# Patient Record
Sex: FEMALE | Race: WHITE | Employment: OTHER | URBAN - METROPOLITAN AREA
[De-identification: names, ages, dates, MRNs, and addresses within clinical notes are randomized per-mention and may not be internally consistent; named-entity substitution may affect disease eponyms.]

---

## 2018-05-16 ENCOUNTER — HOSPITAL ENCOUNTER (EMERGENCY)
Age: 73
Discharge: HOME | End: 2018-05-16
Attending: EMERGENCY MEDICINE
Payer: MEDICARE

## 2018-05-16 ENCOUNTER — APPOINTMENT (OUTPATIENT)
Dept: CT IMAGING | Age: 73
End: 2018-05-16
Attending: EMERGENCY MEDICINE
Payer: MEDICARE

## 2018-05-16 ENCOUNTER — APPOINTMENT (OUTPATIENT)
Dept: MRI IMAGING | Age: 73
End: 2018-05-16
Attending: EMERGENCY MEDICINE
Payer: MEDICARE

## 2018-05-16 VITALS
TEMPERATURE: 98 F | WEIGHT: 170 LBS | DIASTOLIC BLOOD PRESSURE: 70 MMHG | SYSTOLIC BLOOD PRESSURE: 156 MMHG | HEART RATE: 84 BPM | BODY MASS INDEX: 26.68 KG/M2 | HEIGHT: 67 IN | RESPIRATION RATE: 16 BRPM | OXYGEN SATURATION: 98 %

## 2018-05-16 DIAGNOSIS — R42 DIZZINESS: Primary | ICD-10-CM

## 2018-05-16 DIAGNOSIS — J01.00 ACUTE NON-RECURRENT MAXILLARY SINUSITIS: ICD-10-CM

## 2018-05-16 LAB
ALBUMIN SERPL-MCNC: 3.6 G/DL (ref 3.2–4.6)
ALBUMIN/GLOB SERPL: 0.7 {RATIO} (ref 1.2–3.5)
ALP SERPL-CCNC: 67 U/L (ref 50–136)
ALT SERPL-CCNC: 27 U/L (ref 12–65)
ANION GAP SERPL CALC-SCNC: 10 MMOL/L (ref 7–16)
AST SERPL-CCNC: 23 U/L (ref 15–37)
ATRIAL RATE: 68 BPM
BASOPHILS # BLD: 0 K/UL (ref 0–0.2)
BASOPHILS NFR BLD: 1 % (ref 0–2)
BILIRUB SERPL-MCNC: 0.5 MG/DL (ref 0.2–1.1)
BUN SERPL-MCNC: 10 MG/DL (ref 8–23)
CALCIUM SERPL-MCNC: 9.3 MG/DL (ref 8.3–10.4)
CALCULATED P AXIS, ECG09: 66 DEGREES
CALCULATED R AXIS, ECG10: -9 DEGREES
CALCULATED T AXIS, ECG11: 51 DEGREES
CHLORIDE SERPL-SCNC: 106 MMOL/L (ref 98–107)
CO2 SERPL-SCNC: 25 MMOL/L (ref 21–32)
CREAT SERPL-MCNC: 0.83 MG/DL (ref 0.6–1)
DIAGNOSIS, 93000: NORMAL
DIFFERENTIAL METHOD BLD: ABNORMAL
EOSINOPHIL # BLD: 0 K/UL (ref 0–0.8)
EOSINOPHIL NFR BLD: 1 % (ref 0.5–7.8)
ERYTHROCYTE [DISTWIDTH] IN BLOOD BY AUTOMATED COUNT: 13 % (ref 11.9–14.6)
GLOBULIN SER CALC-MCNC: 4.9 G/DL (ref 2.3–3.5)
GLUCOSE SERPL-MCNC: 101 MG/DL (ref 65–100)
HCT VFR BLD AUTO: 45.4 % (ref 35.8–46.3)
HGB BLD-MCNC: 16.1 G/DL (ref 11.7–15.4)
IMM GRANULOCYTES # BLD: 0 K/UL (ref 0–0.5)
IMM GRANULOCYTES NFR BLD AUTO: 0 % (ref 0–5)
LYMPHOCYTES # BLD: 1.8 K/UL (ref 0.5–4.6)
LYMPHOCYTES NFR BLD: 34 % (ref 13–44)
MCH RBC QN AUTO: 31 PG (ref 26.1–32.9)
MCHC RBC AUTO-ENTMCNC: 35.5 G/DL (ref 31.4–35)
MCV RBC AUTO: 87.5 FL (ref 79.6–97.8)
MONOCYTES # BLD: 0.4 K/UL (ref 0.1–1.3)
MONOCYTES NFR BLD: 8 % (ref 4–12)
NEUTS SEG # BLD: 3.1 K/UL (ref 1.7–8.2)
NEUTS SEG NFR BLD: 56 % (ref 43–78)
P-R INTERVAL, ECG05: 186 MS
PLATELET # BLD AUTO: 265 K/UL (ref 150–450)
PMV BLD AUTO: 9.5 FL (ref 10.8–14.1)
POTASSIUM SERPL-SCNC: 3.6 MMOL/L (ref 3.5–5.1)
PROT SERPL-MCNC: 8.5 G/DL (ref 6.3–8.2)
Q-T INTERVAL, ECG07: 392 MS
QRS DURATION, ECG06: 90 MS
QTC CALCULATION (BEZET), ECG08: 416 MS
RBC # BLD AUTO: 5.19 M/UL (ref 4.05–5.25)
SODIUM SERPL-SCNC: 141 MMOL/L (ref 136–145)
TROPONIN I SERPL-MCNC: <0.02 NG/ML (ref 0.02–0.05)
VENTRICULAR RATE, ECG03: 68 BPM
WBC # BLD AUTO: 5.4 K/UL (ref 4.3–11.1)

## 2018-05-16 PROCEDURE — 85025 COMPLETE CBC W/AUTO DIFF WBC: CPT | Performed by: EMERGENCY MEDICINE

## 2018-05-16 PROCEDURE — 93005 ELECTROCARDIOGRAM TRACING: CPT | Performed by: EMERGENCY MEDICINE

## 2018-05-16 PROCEDURE — 81003 URINALYSIS AUTO W/O SCOPE: CPT | Performed by: EMERGENCY MEDICINE

## 2018-05-16 PROCEDURE — 70551 MRI BRAIN STEM W/O DYE: CPT

## 2018-05-16 PROCEDURE — 99285 EMERGENCY DEPT VISIT HI MDM: CPT | Performed by: EMERGENCY MEDICINE

## 2018-05-16 PROCEDURE — 80053 COMPREHEN METABOLIC PANEL: CPT | Performed by: EMERGENCY MEDICINE

## 2018-05-16 PROCEDURE — 84484 ASSAY OF TROPONIN QUANT: CPT | Performed by: EMERGENCY MEDICINE

## 2018-05-16 PROCEDURE — 70450 CT HEAD/BRAIN W/O DYE: CPT

## 2018-05-16 RX ORDER — AMOXICILLIN AND CLAVULANATE POTASSIUM 875; 125 MG/1; MG/1
1 TABLET, FILM COATED ORAL 2 TIMES DAILY
Qty: 14 TAB | Refills: 0 | Status: SHIPPED | OUTPATIENT
Start: 2018-05-16 | End: 2018-08-15 | Stop reason: ALTCHOICE

## 2018-05-16 NOTE — DISCHARGE INSTRUCTIONS
Dizziness: Care Instructions  Your Care Instructions  Dizziness is the feeling of unsteadiness or fuzziness in your head. It is different than having vertigo, which is a feeling that the room is spinning or that you are moving or falling. It is also different from lightheadedness, which is the feeling that you are about to faint. It can be hard to know what causes dizziness. Some people feel dizzy when they have migraine headaches. Sometimes bouts of flu can make you feel dizzy. Some medical conditions, such as heart problems or high blood pressure, can make you feel dizzy. Many medicines can cause dizziness, including medicines for high blood pressure, pain, or anxiety. If a medicine causes your symptoms, your doctor may recommend that you stop or change the medicine. If it is a problem with your heart, you may need medicine to help your heart work better. If there is no clear reason for your symptoms, your doctor may suggest watching and waiting for a while to see if the dizziness goes away on its own. Follow-up care is a key part of your treatment and safety. Be sure to make and go to all appointments, and call your doctor if you are having problems. It's also a good idea to know your test results and keep a list of the medicines you take. How can you care for yourself at home? · If your doctor recommends or prescribes medicine, take it exactly as directed. Call your doctor if you think you are having a problem with your medicine. · Do not drive while you feel dizzy. · Try to prevent falls. Steps you can take include:  ¨ Using nonskid mats, adding grab bars near the tub, and using night-lights. ¨ Clearing your home so that walkways are free of anything you might trip on. ¨ Letting family and friends know that you have been feeling dizzy. This will help them know how to help you. When should you call for help? Call 911 anytime you think you may need emergency care.  For example, call if:  ? · You passed out (lost consciousness). ? · You have dizziness along with symptoms of a heart attack. These may include:  ¨ Chest pain or pressure, or a strange feeling in the chest.  ¨ Sweating. ¨ Shortness of breath. ¨ Nausea or vomiting. ¨ Pain, pressure, or a strange feeling in the back, neck, jaw, or upper belly or in one or both shoulders or arms. ¨ Lightheadedness or sudden weakness. ¨ A fast or irregular heartbeat. ? · You have symptoms of a stroke. These may include:  ¨ Sudden numbness, tingling, weakness, or loss of movement in your face, arm, or leg, especially on only one side of your body. ¨ Sudden vision changes. ¨ Sudden trouble speaking. ¨ Sudden confusion or trouble understanding simple statements. ¨ Sudden problems with walking or balance. ¨ A sudden, severe headache that is different from past headaches. ?Call your doctor now or seek immediate medical care if:  ? · You feel dizzy and have a fever, headache, or ringing in your ears. ? · You have new or increased nausea and vomiting. ? · Your dizziness does not go away or comes back. ? Watch closely for changes in your health, and be sure to contact your doctor if:  ? · You do not get better as expected. Where can you learn more? Go to http://reinaldo-bertrand.info/. Enter G198 in the search box to learn more about \"Dizziness: Care Instructions. \"  Current as of: March 20, 2017  Content Version: 11.4  © 7076-6744 [employer]. Care instructions adapted under license by [employer] (which disclaims liability or warranty for this information). If you have questions about a medical condition or this instruction, always ask your healthcare professional. [place] [address] any warranty or liability for your use of this information. Sinusitis: Care Instructions  Your Care Instructions    Sinusitis is an infection of the lining of the sinus cavities in your head.  Sinusitis often follows a cold. It causes pain and pressure in your head and face. In most cases, sinusitis gets better on its own in 1 to 2 weeks. But some mild symptoms may last for several weeks. Sometimes antibiotics are needed. Follow-up care is a key part of your treatment and safety. Be sure to make and go to all appointments, and call your doctor if you are having problems. It's also a good idea to know your test results and keep a list of the medicines you take. How can you care for yourself at home? · Take an over-the-counter pain medicine, such as acetaminophen (Tylenol), ibuprofen (Advil, Motrin), or naproxen (Aleve). Read and follow all instructions on the label. · If the doctor prescribed antibiotics, take them as directed. Do not stop taking them just because you feel better. You need to take the full course of antibiotics. · Be careful when taking over-the-counter cold or flu medicines and Tylenol at the same time. Many of these medicines have acetaminophen, which is Tylenol. Read the labels to make sure that you are not taking more than the recommended dose. Too much acetaminophen (Tylenol) can be harmful. · Breathe warm, moist air from a steamy shower, a hot bath, or a sink filled with hot water. Avoid cold, dry air. Using a humidifier in your home may help. Follow the directions for cleaning the machine. · Use saline (saltwater) nasal washes to help keep your nasal passages open and wash out mucus and bacteria. You can buy saline nose drops at a grocery store or drugstore. Or you can make your own at home by adding 1 teaspoon of salt and 1 teaspoon of baking soda to 2 cups of distilled water. If you make your own, fill a bulb syringe with the solution, insert the tip into your nostril, and squeeze gently. Destinee Goad your nose. · Put a hot, wet towel or a warm gel pack on your face 3 or 4 times a day for 5 to 10 minutes each time. · Try a decongestant nasal spray like oxymetazoline (Afrin).  Do not use it for more than 3 days in a row. Using it for more than 3 days can make your congestion worse. When should you call for help? Call your doctor now or seek immediate medical care if:  ? · You have new or worse swelling or redness in your face or around your eyes. ? · You have a new or higher fever. ? Watch closely for changes in your health, and be sure to contact your doctor if:  ? · You have new or worse facial pain. ? · The mucus from your nose becomes thicker (like pus) or has new blood in it. ? · You are not getting better as expected. Where can you learn more? Go to http://reinaldo-bertrand.info/. Enter O826 in the search box to learn more about \"Sinusitis: Care Instructions. \"  Current as of: May 12, 2017  Content Version: 11.4  © 4417-8975 [employer]. Care instructions adapted under license by [employer] (which disclaims liability or warranty for this information). If you have questions about a medical condition or this instruction, always ask your healthcare professional. [address] any warranty or liability for your use of this information.
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
## 2018-05-16 NOTE — ED TRIAGE NOTES
Patient presents to ed with complaints of feeling like the room is spinning, light headedness, feeling disoriented, and Heavy feeling to left side of head and body. Patient denies any visual changes, difficulty walking, speech difficulties, facial droop or unilateral weakness. Patient does have decreased sensation to left side of body and states symptoms have been ongoing for 1 week and she feels that they are worse today. Patient is alert and oriented at this time, hand  equal, leg strength equal, no facial droop, clear speech, and no apparent distress.

## 2018-05-16 NOTE — ED NOTES

## 2018-09-05 ENCOUNTER — HOSPITAL ENCOUNTER (OUTPATIENT)
Dept: MRI IMAGING | Age: 73
Discharge: HOME | End: 2018-09-05
Attending: INTERNAL MEDICINE
Payer: MEDICARE

## 2018-09-05 DIAGNOSIS — M25.561 ACUTE PAIN OF RIGHT KNEE: ICD-10-CM

## 2018-09-05 PROCEDURE — 73721 MRI JNT OF LWR EXTRE W/O DYE: CPT

## 2018-09-06 NOTE — PROGRESS NOTES
She has a tear in meniscus that can come and go.  Ortho will fix it  With arthroscopic sx can refer iván

## 2018-09-27 ENCOUNTER — HOSPITAL ENCOUNTER (OUTPATIENT)
Dept: PHYSICAL THERAPY | Age: 73
Discharge: HOME | End: 2018-09-27
Payer: MEDICARE

## 2018-09-27 PROCEDURE — 97162 PT EVAL MOD COMPLEX 30 MIN: CPT

## 2018-09-27 PROCEDURE — 97530 THERAPEUTIC ACTIVITIES: CPT

## 2018-09-27 PROCEDURE — G8979 MOBILITY GOAL STATUS: HCPCS

## 2018-09-27 PROCEDURE — 97110 THERAPEUTIC EXERCISES: CPT

## 2018-09-27 PROCEDURE — G8978 MOBILITY CURRENT STATUS: HCPCS

## 2018-09-27 NOTE — PROGRESS NOTES
Ambulatory/Rehab Services H2 Model Falls Risk Assessment    Risk Factor Pts. ·   Confusion/Disorientation/Impulsivity  []    4 ·   Symptomatic Depression  []   2 ·   Altered Elimination  []   1 ·   Dizziness/Vertigo  []   1 ·   Gender (Male)  []   1 ·   Any administered antiepileptics (anticonvulsants):  []   2 ·   Any administered benzodiazepines:  []   1 ·   Visual Impairment (specify):  []   1 ·   Portable Oxygen Use  []   1 ·   Orthostatic ? BP  []   1 ·   History of Recent Falls (within 3 mos.)  []   5     Ability to Rise from Chair (choose one) Pts. ·   Ability to rise in a single movement  []   0 ·   Pushes up, successful in one attempt  [x]   1 ·   Multiple attempts, but successful  []   3 ·   Unable to rise without assistance  []   4   Total: (5 or greater = High Risk) 1     Falls Prevention Plan:   []                Physical Limitations to Exercise (specify):   []                Mobility Assistance Device (type):   []                Exercise/Equipment Adaptation (specify):    ©2010 [facility] [place] Patent #[id number].  Federal Law prohibits the replication, distribution or use without written permission from [facility] [employer]

## 2018-09-27 NOTE — THERAPY EVALUATION
Bhupinder Ruelas  : 1945 [id number] [facility] [address]  [address] [place] [address].  [phone]   [phone]        OUTPATIENT PHYSICAL THERAPY:Initial Assessment 2018         ICD-10: Treatment Diagnosis: Muscle weakness (generalized) (M62.81)Pain in right knee (M25.561)  Precautions/Allergies: Other medication and Sulfa (sulfonamide antibiotics)   Fall Risk Score: 1 (? 5 = High Risk)  MD Orders: eval and treat MEDICAL/REFERRING DIAGNOSIS:  Knee pain [M25.569]   DATE OF ONSET: a few months ago  REFERRING PHYSICIAN: Padmini Bello MD  RETURN PHYSICIAN APPOINTMENT: 10/29/18     INITIAL ASSESSMENT:  Ms. Lanette Milton presents with pain, stiffness, weakness in right knee that will benefit from skilled PT to restore motion, strength and normalize gait to resume premorbid function. PROBLEM LIST (Impacting functional limitations):  1. Decreased Strength  2. Decreased ADL/Functional Activities  3. Decreased Transfer Abilities  4. Decreased Ambulation Ability/Technique  5. Decreased Balance  6. Increased Pain  7. Decreased Activity Tolerance  8. Decreased Flexibility/Joint Mobility  9. Decreased [place] with Home Exercise Program INTERVENTIONS PLANNED:  1. Home Exercise Program (HEP)  2. Manual Therapy  3. Therapeutic Activites  4. Therapeutic Exercise/Strengthening    TREATMENT PLAN:  Effective Dates: 2018 TO 2018 (90 days). Frequency/Duration: 2 times a week for 90 Days  GOALS: (Goals have been discussed and agreed upon with patient.)  Short-Term Functional Goals: Time Frame: 2 weeks  1. Pt to report compliance with HEP. 2. Pt to demonstrate full knee motion without cueing. Discharge Goals: Time Frame: 8 weeks  1. Pt to increase strength to 5/5 knee ext for normal gait level surfaces. 2. Pt to increase hip abd strength to > 4/5 for reciprocal stair amb.   3. Pt to decrease pain to allow her to resume her previous ex level.  Rehabilitation Potential For Stated Goals: Good  Regarding Jeronimo Cuff therapy, I certify that the treatment plan above will be carried out by a therapist or under their direction. Thank you for this referral,  Mariaelena Sheikh PT       Referring Physician Signature: MaryE llen Paulino MD              Date                      HISTORY:   History of Present Injury/Illness (Reason for Referral):  Pt reports right knee pain she attributes to slipping and pulling knee in April. She then states her knee gave way walking in her house a few weeks ago. She has OA but doesn't think that is the source of her pain. She got a cortisone shot which helped. She cannot walk evenly or do her normal exercise routine because of the pain. Also causing back and hip pain from altered gait. Past Medical History/Comorbidities:   Ms. Ben Nazario  has no past medical history on file.   Ms. Ben Nazario  has a past surgical history that includes hx bartholin cyst marsupialization; hx gyn; and hx other surgical.  Social History/Living Environment:     2 story home but doesn't use upstairs  Prior Level of Function/Work/Activity:  retired  Dominant Side:         RIGHT  Current Medications:    Current Outpatient Prescriptions:     OTHER,NON-FORMULARY,, THYROID HEALTH, Disp: , Rfl:     OTHER,NON-FORMULARY,, BLACK CURRANT SEED  MG, Disp: , Rfl:     OTHER,NON-FORMULARY,, VITAL SCREEN 360, Disp: , Rfl:     OTHER,NON-FORMULARY,, HERBAL AMINO ACID SUPPLEMENT, Disp: , Rfl:     OTHER,NON-FORMULARY,, HEMP EXTRACT SOFTGELS 10MG, Disp: , Rfl:     OTHER,NON-FORMULARY,, BIOLIQUE FX, Disp: , Rfl:     OTHER,NON-FORMULARY,, PRIMAL PLANTS SUPERFOODS BLEND, Disp: , Rfl:     OTHER,NON-FORMULARY,, LECTIN SHIELD, Disp: , Rfl:     OTHER,NON-FORMULARY,, IONIC-FIZZ MAGNESIUM PLUS, Disp: , Rfl:     OTHER,NON-FORMULARY,, COGNISENSE, Disp: , Rfl:     magnesium 250 mg tab, Take  by mouth., Disp: , Rfl:     Omega-3 Fatty Acids 60- mg cpDR, Take  by mouth., Disp: , Rfl:     cholecalciferol, vitamin D3, (VITAMIN D3) 2,000 unit tab, Take  by mouth., Disp: , Rfl:    Date Last Reviewed:  9/27/2018   # of Personal Factors/Comorbidities that affect the Plan of Care: 1-2: MODERATE COMPLEXITY   EXAMINATION:   Observation/Orthostatic Postural Assessment:          Stiff, antalgic gait. Holds knee flexed  Palpation:          Mild crepitus  ROM:     AROM  Right knee flex-ext 120-5, left 133-0  Right > left gastroc tightness      Strength:     3/5 hip abd, ext bilaterally  3/5 right knee ext, 5/5 left      Special Tests:          unremarkable  Neurological Screen:        unremarkable  Functional Mobility:         Gait/Ambulation:  Antalgic         Stairs:  Step-to pattern  Balance:          Unable to assess due to pain   Body Structures Involved:  1. Joints  2. Muscles Body Functions Affected:  1. Sensory/Pain  2. Neuromusculoskeletal  3. Movement Related Activities and Participation Affected:  1. General Tasks and Demands  2. Mobility  3. Self Care  4. Domestic Life  5. Community, Social and [place]   # of elements that affect the Plan of Care: 3: MODERATE COMPLEXITY   CLINICAL PRESENTATION:   Presentation: Evolving clinical presentation with changing clinical characteristics: MODERATE COMPLEXITY   CLINICAL DECISION MAKING:   Outcome Measure: Tool Used: Lower Extremity Functional Scale (LEFS)  Score:  Initial: 42/80 Most Recent: X/80 (Date: -- )   Interpretation of Score: 20 questions each scored on a 5 point scale with 0 representing \"extreme difficulty or unable to perform\" and 4 representing \"no difficulty\". The lower the score, the greater the functional disability. 80/80 represents no disability. Minimal detectable change is 9 points. Score 80 79-63 62-48 47-32 31-16 15-1 0   Modifier CH CI CJ CK CL CM CN     ?  Mobility - Walking and Moving Around:     - CURRENT STATUS: CK - 40%-59% impaired, limited or restricted    - GOAL STATUS: CJ - 20%-39% impaired, limited or restricted    - D/C STATUS:  ---------------To be determined---------------    Medical Necessity:   · Patient demonstrates good rehab potential due to higher previous functional level. Reason for Services/Other Comments:  · Patient continues to require present interventions due to patient's inability to walk normally and exercise. Use of outcome tool(s) and clinical judgement create a POC that gives a: Questionable prediction of patient's progress: MODERATE COMPLEXITY   TREATMENT:   (In addition to Assessment/Re-Assessment sessions the following treatments were rendered)  THERAPEUTIC ACTIVITY: ( see below for minutes): Therapeutic activities per grid below to improve mobility. Required moderate verbal and manual cues to promote motor control of right, lower extremity(s). THERAPEUTIC EXERCISE: (see below for minutes):  Exercises per grid below to improve strength. Required moderate verbal and manual cues to promote proper body alignment, promote proper body posture and promote proper body mechanics. Progressed resistance, range, repetitions and complexity of movement as indicated. MANUAL THERAPY: (see below for minutes): Soft tissue mobilization was utilized and necessary because of the patient's restricted motion of soft tissue.    MODALITIES: (see below for minutes):      for pain modulation     Date: 9/27/18       Modalities:                                Therapeutic Exercise: 15 mins       SLR X 10       S/L hip abd X 10       clams X 10       Prone hip ext B X 10       PKF X 10               Proprioceptive Activities:                                Manual Therapy:                        Therapeutic Activities: 15 mins       Pt education, postural education, HEP 10 mins       Bridging without hams, paraspinals X 10                       HEP: see hand out  [employer] [facility]  Treatment/Session Assessment:  Pt has a lot of anxiety over her knee pain has developed compensatory postures to protect it. She had improved motion and gait after session and reported no pain. She should do well with ex jennie ad progression to reach her goals. · Pain/ Symptoms: Initial:   7/10 Post Session:  0/10 ·   Compliance with Program/Exercises: Will assess as treatment progresses. · Recommendations/Intent for next treatment session: \"Next visit will focus on advancements to more challenging activities\".   Total Treatment Duration:  PT Patient Time In/Time Out  Time In: 1100  Time Out: Moncho Pena PT

## 2018-10-02 ENCOUNTER — HOSPITAL ENCOUNTER (OUTPATIENT)
Dept: PHYSICAL THERAPY | Age: 73
Discharge: HOME | End: 2018-10-02
Payer: MEDICARE

## 2018-10-02 PROCEDURE — 97110 THERAPEUTIC EXERCISES: CPT

## 2018-10-02 PROCEDURE — 97530 THERAPEUTIC ACTIVITIES: CPT

## 2018-10-02 NOTE — PROGRESS NOTES
Glenn Sands  : 1945 [address]  [address] [place] [address].  [phone]   [phone]        OUTPATIENT PHYSICAL THERAPY:Daily Note 10/2/2018         ICD-10: Treatment Diagnosis: Muscle weakness (generalized) (M62.81)Pain in right knee (M25.561)  Precautions/Allergies: Other medication and Sulfa (sulfonamide antibiotics)   Fall Risk Score: 1 (? 5 = High Risk)  MD Orders: eval and treat MEDICAL/REFERRING DIAGNOSIS:  Knee pain [M25.569]   DATE OF ONSET: a few months ago  REFERRING PHYSICIAN: Soraya Bajwa MD  RETURN PHYSICIAN APPOINTMENT: 10/29/18     INITIAL ASSESSMENT:  Ms. Soco Franco presents with pain, stiffness, weakness in right knee that will benefit from skilled PT to restore motion, strength and normalize gait to resume premorbid function. PROBLEM LIST (Impacting functional limitations):  1. Decreased Strength  2. Decreased ADL/Functional Activities  3. Decreased Transfer Abilities  4. Decreased Ambulation Ability/Technique  5. Decreased Balance  6. Increased Pain  7. Decreased Activity Tolerance  8. Decreased Flexibility/Joint Mobility  9. Decreased [place] with Home Exercise Program INTERVENTIONS PLANNED:  1. Home Exercise Program (HEP)  2. Manual Therapy  3. Therapeutic Activites  4. Therapeutic Exercise/Strengthening    TREATMENT PLAN:  Effective Dates: 2018 TO 2018 (90 days). Frequency/Duration: 2 times a week for 90 Days  GOALS: (Goals have been discussed and agreed upon with patient.)  Short-Term Functional Goals: Time Frame: 2 weeks  1. Pt to report compliance with HEP. 2. Pt to demonstrate full knee motion without cueing. Discharge Goals: Time Frame: 8 weeks  1. Pt to increase strength to 5/5 knee ext for normal gait level surfaces. 2. Pt to increase hip abd strength to > 4/5 for reciprocal stair amb. 3. Pt to decrease pain to allow her to resume her previous ex level. HISTORY:   History of Present Injury/Illness (Reason for Referral):  Pt reports right knee pain she attributes to slipping and pulling knee in April. She then states her knee gave way walking in her house a few weeks ago. She has OA but doesn't think that is the source of her pain. She got a cortisone shot which helped. She cannot walk evenly or do her normal exercise routine because of the pain. Also causing back and hip pain from altered gait. Past Medical History/Comorbidities:   Ms. Hernandez  has no past medical history on file. Ms. Hernandez  has a past surgical history that includes hx bartholin cyst marsupialization; hx gyn; and hx other surgical.  Social History/Living Environment:     2 story home but doesn't use upstairs  Prior Level of Function/Work/Activity:  retired  Dominant Side:         RIGHT  Current Medications:    Current Outpatient Prescriptions:     OTHER,NON-FORMULARY,, THYROID HEALTH, Disp: , Rfl:     OTHER,NON-FORMULARY,, BLACK CURRANT SEED  MG, Disp: , Rfl:     OTHER,NON-FORMULARY,, VITAL SCREEN 360, Disp: , Rfl:     OTHER,NON-FORMULARY,, HERBAL AMINO ACID SUPPLEMENT, Disp: , Rfl:     OTHER,NON-FORMULARY,, HEMP EXTRACT SOFTGELS 10MG, Disp: , Rfl:     OTHER,NON-FORMULARY,, BIOLIQUE FX, Disp: , Rfl:     OTHER,NON-FORMULARY,, PRIMAL PLANTS SUPERFOODS BLEND, Disp: , Rfl:     OTHER,NON-FORMULARY,, LECTIN SHIELD, Disp: , Rfl:     OTHER,NON-FORMULARY,, IONIC-FIZZ MAGNESIUM PLUS, Disp: , Rfl:     OTHER,NON-FORMULARY,, COGNISENSE, Disp: , Rfl:     magnesium 250 mg tab, Take  by mouth., Disp: , Rfl:     Omega-3 Fatty Acids 60- mg cpDR, Take  by mouth., Disp: , Rfl:     cholecalciferol, vitamin D3, (VITAMIN D3) 2,000 unit tab, Take  by mouth., Disp: , Rfl:    Date Last Reviewed:  10/2/2018   EXAMINATION:   Observation/Orthostatic Postural Assessment:          Stiff, antalgic gait.   Holds knee flexed  Palpation:          Mild crepitus  ROM:     AROM  Right knee flex-ext 120-5, left 133-0  Right > left gastroc tightness      Strength:     3/5 hip abd, ext bilaterally  3/5 right knee ext, 5/5 left      Special Tests:          unremarkable  Neurological Screen:        unremarkable  Functional Mobility:         Gait/Ambulation:  Antalgic         Stairs:  Step-to pattern  Balance:          Unable to assess due to pain   Body Structures Involved:  1. Joints  2. Muscles Body Functions Affected:  1. Sensory/Pain  2. Neuromusculoskeletal  3. Movement Related Activities and Participation Affected:  1. General Tasks and Demands  2. Mobility  3. Self Care  4. Domestic Life  5. Community, Social and Civic Life   CLINICAL PRESENTATION:   CLINICAL DECISION MAKING:   Outcome Measure: Tool Used: Lower Extremity Functional Scale (LEFS)  Score:  Initial: 42/80 Most Recent: X/80 (Date: -- )   Interpretation of Score: 20 questions each scored on a 5 point scale with 0 representing \"extreme difficulty or unable to perform\" and 4 representing \"no difficulty\". The lower the score, the greater the functional disability. 80/80 represents no disability. Minimal detectable change is 9 points. Score 80 79-63 62-48 47-32 31-16 15-1 0   Modifier CH CI CJ CK CL CM CN     ? Mobility - Walking and Moving Around:     - CURRENT STATUS: CK - 40%-59% impaired, limited or restricted    - GOAL STATUS: CJ - 20%-39% impaired, limited or restricted    - D/C STATUS:  ---------------To be determined---------------    Medical Necessity:   · Patient demonstrates good rehab potential due to higher previous functional level. Reason for Services/Other Comments:  · Patient continues to require present interventions due to patient's inability to walk normally and exercise. TREATMENT:   (In addition to Assessment/Re-Assessment sessions the following treatments were rendered)  THERAPEUTIC ACTIVITY: ( see below for minutes): Therapeutic activities per grid below to improve mobility.   Required moderate verbal and manual cues to promote motor control of right, lower extremity(s). THERAPEUTIC EXERCISE: (see below for minutes):  Exercises per grid below to improve strength. Required moderate verbal and manual cues to promote proper body alignment, promote proper body posture and promote proper body mechanics. Progressed resistance, range, repetitions and complexity of movement as indicated. MANUAL THERAPY: (see below for minutes): Soft tissue mobilization was utilized and necessary because of the patient's restricted motion of soft tissue. MODALITIES: (see below for minutes):      for pain modulation     Date: 9/27/18 10/2/18  Visit 2      Modalities:                                Therapeutic Exercise: 15 mins 25 mins      SLR X 10 X 20      S/L hip abd X 10 X 20      clams X 10 X 30      Prone hip ext B X 10 X 30      PKF X 10 X 30      Slant board bent and straight knee   5 x 10 sec                      Proprioceptive Activities:                                Manual Therapy:                        Therapeutic Activities: 15 mins 15 mins      Pt education, postural education, HEP 10 mins Review of biomechanics      Bridging without hams, paraspinals X 10 X 30      Sit to stand without UE support  X 20      SLS        Lat stepping                HEP: see hand out  [employer] Portal  Treatment/Session Assessment: Pt needs cues for all ex's. Good ex effort and jennie. Good response. Continue as indicated. · Pain/ Symptoms: Initial:   0/10 no real pain now. They hurt when I start to walk but I can already see improvement. Post Session:  0/10 ·   Compliance with Program/Exercises: Will assess as treatment progresses. · Recommendations/Intent for next treatment session: \"Next visit will focus on advancements to more challenging activities\".   Total Treatment Duration:  PT Patient Time In/Time Out  Time In: 1100  Time Out: Moncho Pena PT

## 2018-10-04 ENCOUNTER — HOSPITAL ENCOUNTER (OUTPATIENT)
Dept: PHYSICAL THERAPY | Age: 73
Discharge: HOME | End: 2018-10-04
Payer: MEDICARE

## 2018-10-04 PROCEDURE — 97110 THERAPEUTIC EXERCISES: CPT

## 2018-10-04 PROCEDURE — 97530 THERAPEUTIC ACTIVITIES: CPT

## 2018-10-04 NOTE — PROGRESS NOTES
Kathia Sheppard  : 1945 [address], [address] [place] [address]  [phone]   [phone]        OUTPATIENT PHYSICAL THERAPY:Daily Note 10/4/2018         ICD-10: Treatment Diagnosis: Muscle weakness (generalized) (M62.81)Pain in right knee (M25.561)  Precautions/Allergies: Other medication and Sulfa (sulfonamide antibiotics)   Fall Risk Score: 1 (? 5 = High Risk)  MD Orders: eval and treat MEDICAL/REFERRING DIAGNOSIS:  Knee pain [M25.569]   DATE OF ONSET: a few months ago  REFERRING PHYSICIAN: Angela Santiago MD  RETURN PHYSICIAN APPOINTMENT: 10/29/18     INITIAL ASSESSMENT:  Ms. Jacqui Villavicencio presents with pain, stiffness, weakness in right knee that will benefit from skilled PT to restore motion, strength and normalize gait to resume premorbid function. PROBLEM LIST (Impacting functional limitations):  1. Decreased Strength  2. Decreased ADL/Functional Activities  3. Decreased Transfer Abilities  4. Decreased Ambulation Ability/Technique  5. Decreased Balance  6. Increased Pain  7. Decreased Activity Tolerance  8. Decreased Flexibility/Joint Mobility  9. Decreased [place] with Home Exercise Program INTERVENTIONS PLANNED:  1. Home Exercise Program (HEP)  2. Manual Therapy  3. Therapeutic Activites  4. Therapeutic Exercise/Strengthening    TREATMENT PLAN:  Effective Dates: 2018 TO 2018 (90 days). Frequency/Duration: 2 times a week for 90 Days  GOALS: (Goals have been discussed and agreed upon with patient.)  Short-Term Functional Goals: Time Frame: 2 weeks  1. Pt to report compliance with HEP. 2. Pt to demonstrate full knee motion without cueing. Discharge Goals: Time Frame: 8 weeks  1. Pt to increase strength to 5/5 knee ext for normal gait level surfaces. 2. Pt to increase hip abd strength to > 4/5 for reciprocal stair amb. 3. Pt to decrease pain to allow her to resume her previous ex level. HISTORY:   History of Present Injury/Illness (Reason for Referral):  Pt reports right knee pain she attributes to slipping and pulling knee in April. She then states her knee gave way walking in her house a few weeks ago. She has OA but doesn't think that is the source of her pain. She got a cortisone shot which helped. She cannot walk evenly or do her normal exercise routine because of the pain. Also causing back and hip pain from altered gait. Past Medical History/Comorbidities:   Ms. Kayla Barcenas  has no past medical history on file. Ms. Kayla Barcenas  has a past surgical history that includes hx bartholin cyst marsupialization; hx gyn; and hx other surgical.  Social History/Living Environment:     2 story home but doesn't use upstairs  Prior Level of Function/Work/Activity:  retired  Dominant Side:         RIGHT  Current Medications:    Current Outpatient Prescriptions:     OTHER,NON-FORMULARY,, THYROID HEALTH, Disp: , Rfl:     OTHER,NON-FORMULARY,, BLACK CURRANT SEED  MG, Disp: , Rfl:     OTHER,NON-FORMULARY,, VITAL SCREEN 360, Disp: , Rfl:     OTHER,NON-FORMULARY,, HERBAL AMINO ACID SUPPLEMENT, Disp: , Rfl:     OTHER,NON-FORMULARY,, HEMP EXTRACT SOFTGELS 10MG, Disp: , Rfl:     OTHER,NON-FORMULARY,, BIOLIQUE FX, Disp: , Rfl:     OTHER,NON-FORMULARY,, PRIMAL PLANTS SUPERFOODS BLEND, Disp: , Rfl:     OTHER,NON-FORMULARY,, LECTIN SHIELD, Disp: , Rfl:     OTHER,NON-FORMULARY,, IONIC-FIZZ MAGNESIUM PLUS, Disp: , Rfl:     OTHER,NON-FORMULARY,, COGNISENSE, Disp: , Rfl:     magnesium 250 mg tab, Take  by mouth., Disp: , Rfl:     Omega-3 Fatty Acids 60- mg cpDR, Take  by mouth., Disp: , Rfl:     cholecalciferol, vitamin D3, (VITAMIN D3) 2,000 unit tab, Take  by mouth., Disp: , Rfl:    Date Last Reviewed:  10/4/2018   EXAMINATION:   Observation/Orthostatic Postural Assessment:          Stiff, antalgic gait.   Holds knee flexed  Palpation:          Mild crepitus  ROM:     AROM  Right knee flex-ext 120-5, left 133-0  Right > left gastroc tightness      Strength:     3/5 hip abd, ext bilaterally  3/5 right knee ext, 5/5 left      Special Tests:          unremarkable  Neurological Screen:        unremarkable  Functional Mobility:         Gait/Ambulation:  Antalgic         Stairs:  Step-to pattern  Balance:          Unable to assess due to pain   Body Structures Involved:  1. Joints  2. Muscles Body Functions Affected:  1. Sensory/Pain  2. Neuromusculoskeletal  3. Movement Related Activities and Participation Affected:  1. General Tasks and Demands  2. Mobility  3. Self Care  4. Domestic Life  5. Community, Social and Civic Life   CLINICAL PRESENTATION:   CLINICAL DECISION MAKING:   Outcome Measure: Tool Used: Lower Extremity Functional Scale (LEFS)  Score:  Initial: 42/80 Most Recent: X/80 (Date: -- )   Interpretation of Score: 20 questions each scored on a 5 point scale with 0 representing \"extreme difficulty or unable to perform\" and 4 representing \"no difficulty\". The lower the score, the greater the functional disability. 80/80 represents no disability. Minimal detectable change is 9 points. Score 80 79-63 62-48 47-32 31-16 15-1 0   Modifier CH CI CJ CK CL CM CN     ? Mobility - Walking and Moving Around:     - CURRENT STATUS: CK - 40%-59% impaired, limited or restricted    - GOAL STATUS: CJ - 20%-39% impaired, limited or restricted    - D/C STATUS:  ---------------To be determined---------------    Medical Necessity:   · Patient demonstrates good rehab potential due to higher previous functional level. Reason for Services/Other Comments:  · Patient continues to require present interventions due to patient's inability to walk normally and exercise. TREATMENT:   (In addition to Assessment/Re-Assessment sessions the following treatments were rendered)  THERAPEUTIC ACTIVITY: ( see below for minutes): Therapeutic activities per grid below to improve mobility.   Required moderate verbal and manual cues to promote motor control of right, lower extremity(s). THERAPEUTIC EXERCISE: (see below for minutes):  Exercises per grid below to improve strength. Required moderate verbal and manual cues to promote proper body alignment, promote proper body posture and promote proper body mechanics. Progressed resistance, range, repetitions and complexity of movement as indicated. MANUAL THERAPY: (see below for minutes): Soft tissue mobilization was utilized and necessary because of the patient's restricted motion of soft tissue. MODALITIES: (see below for minutes):      for pain modulation     Date: 9/27/18 10/2/18  Visit 2 10/4/18  Visit 3     Modalities:                                Therapeutic Exercise: 15 mins 25 mins 25 mins     SLR X 10 X 20 X 30     S/L hip abd X 10 X 20 X 30     clams X 10 X 30 X 30     Prone hip ext B X 10 X 30 X 30     PKF X 10 X 30 X 30     Slant board bent and straight knee   5 x 10 sec 5 x 10 sec                     Proprioceptive Activities:                                Manual Therapy:                        Therapeutic Activities: 15 mins 15 mins 20 mins     Pt education, postural education, HEP 10 mins Review of biomechanics      Bridging without hams, paraspinals X 10 X 30 X 30     Sit to stand without UE support  X 20 X 30     SLS   X 2     Lat stepping   X 2 laps             HEP: see hand out  [employer] Portal  Treatment/Session Assessment: Cues still needed for every exercise. Improved alignment and control. Continue as indicated. · Pain/ Symptoms: Initial:   Knee is okay actually. No real pain today. 0/10 Post Session:  0/10 ·   Compliance with Program/Exercises: Will assess as treatment progresses. · Recommendations/Intent for next treatment session: \"Next visit will focus on advancements to more challenging activities\".   Total Treatment Duration:  PT Patient Time In/Time Out  Time In: 1100  Time Out: Moncho Pena, PT

## 2018-10-09 ENCOUNTER — HOSPITAL ENCOUNTER (OUTPATIENT)
Dept: PHYSICAL THERAPY | Age: 73
Discharge: HOME | End: 2018-10-09
Payer: MEDICARE

## 2018-10-09 PROCEDURE — 97530 THERAPEUTIC ACTIVITIES: CPT

## 2018-10-09 PROCEDURE — 97110 THERAPEUTIC EXERCISES: CPT

## 2018-10-09 NOTE — PROGRESS NOTES
Donis Terrell  : 1945 [address]  [address] [place] [address].  [phone]   [phone]        OUTPATIENT PHYSICAL THERAPY:Daily Note 10/9/2018         ICD-10: Treatment Diagnosis: Muscle weakness (generalized) (M62.81)Pain in right knee (M25.561)  Precautions/Allergies: Other medication and Sulfa (sulfonamide antibiotics)   Fall Risk Score: 1 (? 5 = High Risk)  MD Orders: eval and treat MEDICAL/REFERRING DIAGNOSIS:  Knee pain [M25.569]   DATE OF ONSET: a few months ago  REFERRING PHYSICIAN: Omie Curling, MD  RETURN PHYSICIAN APPOINTMENT: 10/29/18     INITIAL ASSESSMENT:  Ms. Tristian Zimmer presents with pain, stiffness, weakness in right knee that will benefit from skilled PT to restore motion, strength and normalize gait to resume premorbid function. PROBLEM LIST (Impacting functional limitations):  1. Decreased Strength  2. Decreased ADL/Functional Activities  3. Decreased Transfer Abilities  4. Decreased Ambulation Ability/Technique  5. Decreased Balance  6. Increased Pain  7. Decreased Activity Tolerance  8. Decreased Flexibility/Joint Mobility  9. Decreased [place] with Home Exercise Program INTERVENTIONS PLANNED:  1. Home Exercise Program (HEP)  2. Manual Therapy  3. Therapeutic Activites  4. Therapeutic Exercise/Strengthening    TREATMENT PLAN:  Effective Dates: 2018 TO 2018 (90 days). Frequency/Duration: 2 times a week for 90 Days  GOALS: (Goals have been discussed and agreed upon with patient.)  Short-Term Functional Goals: Time Frame: 2 weeks  1. Pt to report compliance with HEP. 2. Pt to demonstrate full knee motion without cueing. Discharge Goals: Time Frame: 8 weeks  1. Pt to increase strength to 5/5 knee ext for normal gait level surfaces. 2. Pt to increase hip abd strength to > 4/5 for reciprocal stair amb. 3. Pt to decrease pain to allow her to resume her previous ex level. HISTORY:   History of Present Injury/Illness (Reason for Referral):  Pt reports right knee pain she attributes to slipping and pulling knee in April. She then states her knee gave way walking in her house a few weeks ago. She has OA but doesn't think that is the source of her pain. She got a cortisone shot which helped. She cannot walk evenly or do her normal exercise routine because of the pain. Also causing back and hip pain from altered gait. Past Medical History/Comorbidities:   Ms. Wiliam Amezcua  has no past medical history on file. Ms. Wiliam Amezcua  has a past surgical history that includes hx bartholin cyst marsupialization; hx gyn; and hx other surgical.  Social History/Living Environment:     2 story home but doesn't use upstairs  Prior Level of Function/Work/Activity:  retired  Dominant Side:         RIGHT  Current Medications:    Current Outpatient Prescriptions:     OTHER,NON-FORMULARY,, THYROID HEALTH, Disp: , Rfl:     OTHER,NON-FORMULARY,, BLACK CURRANT SEED  MG, Disp: , Rfl:     OTHER,NON-FORMULARY,, VITAL SCREEN 360, Disp: , Rfl:     OTHER,NON-FORMULARY,, HERBAL AMINO ACID SUPPLEMENT, Disp: , Rfl:     OTHER,NON-FORMULARY,, HEMP EXTRACT SOFTGELS 10MG, Disp: , Rfl:     OTHER,NON-FORMULARY,, BIOLIQUE FX, Disp: , Rfl:     OTHER,NON-FORMULARY,, PRIMAL PLANTS SUPERFOODS BLEND, Disp: , Rfl:     OTHER,NON-FORMULARY,, LECTIN SHIELD, Disp: , Rfl:     OTHER,NON-FORMULARY,, IONIC-FIZZ MAGNESIUM PLUS, Disp: , Rfl:     OTHER,NON-FORMULARY,, COGNISENSE, Disp: , Rfl:     magnesium 250 mg tab, Take  by mouth., Disp: , Rfl:     Omega-3 Fatty Acids 60- mg cpDR, Take  by mouth., Disp: , Rfl:     cholecalciferol, vitamin D3, (VITAMIN D3) 2,000 unit tab, Take  by mouth., Disp: , Rfl:    Date Last Reviewed:  10/9/2018   EXAMINATION:   Observation/Orthostatic Postural Assessment:          Stiff, antalgic gait.   Holds knee flexed  Palpation:          Mild crepitus  ROM:     AROM  Right knee flex-ext 120-5, left 133-0  Right > left gastroc tightness      Strength:     3/5 hip abd, ext bilaterally  3/5 right knee ext, 5/5 left      Special Tests:          unremarkable  Neurological Screen:        unremarkable  Functional Mobility:         Gait/Ambulation:  Antalgic         Stairs:  Step-to pattern  Balance:          Unable to assess due to pain   Body Structures Involved:  1. Joints  2. Muscles Body Functions Affected:  1. Sensory/Pain  2. Neuromusculoskeletal  3. Movement Related Activities and Participation Affected:  1. General Tasks and Demands  2. Mobility  3. Self Care  4. Domestic Life  5. Community, Social and Civic Life   CLINICAL PRESENTATION:   CLINICAL DECISION MAKING:   Outcome Measure: Tool Used: Lower Extremity Functional Scale (LEFS)  Score:  Initial: 42/80 Most Recent: X/80 (Date: -- )   Interpretation of Score: 20 questions each scored on a 5 point scale with 0 representing \"extreme difficulty or unable to perform\" and 4 representing \"no difficulty\". The lower the score, the greater the functional disability. 80/80 represents no disability. Minimal detectable change is 9 points. Score 80 79-63 62-48 47-32 31-16 15-1 0   Modifier CH CI CJ CK CL CM CN     ? Mobility - Walking and Moving Around:     - CURRENT STATUS: CK - 40%-59% impaired, limited or restricted    - GOAL STATUS: CJ - 20%-39% impaired, limited or restricted    - D/C STATUS:  ---------------To be determined---------------    Medical Necessity:   · Patient demonstrates good rehab potential due to higher previous functional level. Reason for Services/Other Comments:  · Patient continues to require present interventions due to patient's inability to walk normally and exercise. TREATMENT:   (In addition to Assessment/Re-Assessment sessions the following treatments were rendered)  THERAPEUTIC ACTIVITY: ( see below for minutes): Therapeutic activities per grid below to improve mobility.   Required moderate verbal and manual cues to promote motor control of right, lower extremity(s). THERAPEUTIC EXERCISE: (see below for minutes):  Exercises per grid below to improve strength. Required moderate verbal and manual cues to promote proper body alignment, promote proper body posture and promote proper body mechanics. Progressed resistance, range, repetitions and complexity of movement as indicated. MANUAL THERAPY: (see below for minutes): Soft tissue mobilization was utilized and necessary because of the patient's restricted motion of soft tissue. MODALITIES: (see below for minutes):      for pain modulation     Date: 9/27/18 10/2/18  Visit 2 10/4/18  Visit 3 10/9/18  Visit 4       Modalities:                                            Therapeutic Exercise: 15 mins 25 mins 25 mins 20 mins       SLR X 10 X 20 X 30 X 30       S/L hip abd X 10 X 20 X 30 X 30       clams X 10 X 30 X 30 X 30       Prone hip ext B X 10 X 30 X 30 X 30       PKF X 10 X 30 X 30 X 30       Slant board bent and straight knee   5 x 10 sec 5 x 10 sec 5 x 10 sec                             Proprioceptive Activities:                                            Manual Therapy:                                 Therapeutic Activities: 15 mins 15 mins 20 mins 25 mins       Pt education, postural education, HEP 10 mins Review of biomechanics         Bridging without hams, paraspinals X 10 X 30 X 30 X 30       Sit to stand without UE support  X 20 X 30 X 30       SLS   X 2 X 3        Lat stepping   X 2 laps X 4 laps       Step ups lat    4 inches  X 30       HEP: see hand out  [employer] Portal  Treatment/Session Assessment: Mild popliteal swelling. Cues still needed for ex's. Continue as indicated. · Pain/ Symptoms: Initial:   1-2/10  It is feeling a lot better. I only feel it behind my knee. Post Session:  0/10 ·   Compliance with Program/Exercises: Will assess as treatment progresses.   · Recommendations/Intent for next treatment session: \"Next visit will focus on advancements to more challenging activities\".   Total Treatment Duration:  PT Patient Time In/Time Out  Time In: 1100  Time Out: Moncho Pena PT

## 2018-10-11 ENCOUNTER — HOSPITAL ENCOUNTER (OUTPATIENT)
Dept: PHYSICAL THERAPY | Age: 73
Discharge: HOME | End: 2018-10-11
Payer: MEDICARE

## 2018-10-11 PROCEDURE — 97110 THERAPEUTIC EXERCISES: CPT

## 2018-10-11 PROCEDURE — 97530 THERAPEUTIC ACTIVITIES: CPT

## 2018-10-11 NOTE — PROGRESS NOTES
Adrienne Patient  : 1945 [address] [facility] [address]  [address] [place] [address].  [phone]   [phone]        OUTPATIENT PHYSICAL THERAPY:Daily Note 10/11/2018         ICD-10: Treatment Diagnosis: Muscle weakness (generalized) (M62.81)Pain in right knee (M25.561)  Precautions/Allergies: Other medication and Sulfa (sulfonamide antibiotics)   Fall Risk Score: 1 (? 5 = High Risk)  MD Orders: eval and treat MEDICAL/REFERRING DIAGNOSIS:  Knee pain [M25.569]   DATE OF ONSET: a few months ago  REFERRING PHYSICIAN: Guillermina Lucio MD  RETURN PHYSICIAN APPOINTMENT: 10/29/18     INITIAL ASSESSMENT:  Ms. Mayra Noble presents with pain, stiffness, weakness in right knee that will benefit from skilled PT to restore motion, strength and normalize gait to resume premorbid function. PROBLEM LIST (Impacting functional limitations):  1. Decreased Strength  2. Decreased ADL/Functional Activities  3. Decreased Transfer Abilities  4. Decreased Ambulation Ability/Technique  5. Decreased Balance  6. Increased Pain  7. Decreased Activity Tolerance  8. Decreased Flexibility/Joint Mobility  9. Decreased [place] with Home Exercise Program INTERVENTIONS PLANNED:  1. Home Exercise Program (HEP)  2. Manual Therapy  3. Therapeutic Activites  4. Therapeutic Exercise/Strengthening    TREATMENT PLAN:  Effective Dates: 2018 TO 2018 (90 days). Frequency/Duration: 2 times a week for 90 Days  GOALS: (Goals have been discussed and agreed upon with patient.)  Short-Term Functional Goals: Time Frame: 2 weeks  1. Pt to report compliance with HEP. 2. Pt to demonstrate full knee motion without cueing. Discharge Goals: Time Frame: 8 weeks  1. Pt to increase strength to 5/5 knee ext for normal gait level surfaces. 2. Pt to increase hip abd strength to > 4/5 for reciprocal stair amb. 3. Pt to decrease pain to allow her to resume her previous ex level. HISTORY:   History of Present Injury/Illness (Reason for Referral):  Pt reports right knee pain she attributes to slipping and pulling knee in April. She then states her knee gave way walking in her house a few weeks ago. She has OA but doesn't think that is the source of her pain. She got a cortisone shot which helped. She cannot walk evenly or do her normal exercise routine because of the pain. Also causing back and hip pain from altered gait. Past Medical History/Comorbidities:   Ms. Navneet Barroso  has no past medical history on file. Ms. Navneet Barroso  has a past surgical history that includes hx bartholin cyst marsupialization; hx gyn; and hx other surgical.  Social History/Living Environment:     2 story home but doesn't use upstairs  Prior Level of Function/Work/Activity:  retired  Dominant Side:         RIGHT  Current Medications:    Current Outpatient Prescriptions:     OTHER,NON-FORMULARY,, THYROID HEALTH, Disp: , Rfl:     OTHER,NON-FORMULARY,, BLACK CURRANT SEED  MG, Disp: , Rfl:     OTHER,NON-FORMULARY,, VITAL SCREEN 360, Disp: , Rfl:     OTHER,NON-FORMULARY,, HERBAL AMINO ACID SUPPLEMENT, Disp: , Rfl:     OTHER,NON-FORMULARY,, HEMP EXTRACT SOFTGELS 10MG, Disp: , Rfl:     OTHER,NON-FORMULARY,, BIOLIQUE FX, Disp: , Rfl:     OTHER,NON-FORMULARY,, PRIMAL PLANTS SUPERFOODS BLEND, Disp: , Rfl:     OTHER,NON-FORMULARY,, LECTIN SHIELD, Disp: , Rfl:     OTHER,NON-FORMULARY,, IONIC-FIZZ MAGNESIUM PLUS, Disp: , Rfl:     OTHER,NON-FORMULARY,, COGNISENSE, Disp: , Rfl:     magnesium 250 mg tab, Take  by mouth., Disp: , Rfl:     Omega-3 Fatty Acids 60- mg cpDR, Take  by mouth., Disp: , Rfl:     cholecalciferol, vitamin D3, (VITAMIN D3) 2,000 unit tab, Take  by mouth., Disp: , Rfl:    Date Last Reviewed:  10/11/2018   EXAMINATION:   Observation/Orthostatic Postural Assessment:          Stiff, antalgic gait.   Holds knee flexed  Palpation:          Mild crepitus  ROM:     AROM Right knee flex-ext 130-0, left 133-0  Right > left gastroc tightness  10/11/18      Strength:     3/5 hip abd, ext bilaterally  3/5 right knee ext, 5/5 left      Special Tests:          unremarkable  Neurological Screen:        unremarkable  Functional Mobility:         Gait/Ambulation:  Antalgic         Stairs:  Step-to pattern  Balance:          Unable to assess due to pain   Body Structures Involved:  1. Joints  2. Muscles Body Functions Affected:  1. Sensory/Pain  2. Neuromusculoskeletal  3. Movement Related Activities and Participation Affected:  1. General Tasks and Demands  2. Mobility  3. Self Care  4. Domestic Life  5. Community, Social and Civic Life   CLINICAL PRESENTATION:   CLINICAL DECISION MAKING:   Outcome Measure: Tool Used: Lower Extremity Functional Scale (LEFS)  Score:  Initial: 42/80 Most Recent: X/80 (Date: -- )   Interpretation of Score: 20 questions each scored on a 5 point scale with 0 representing \"extreme difficulty or unable to perform\" and 4 representing \"no difficulty\". The lower the score, the greater the functional disability. 80/80 represents no disability. Minimal detectable change is 9 points. Score 80 79-63 62-48 47-32 31-16 15-1 0   Modifier CH CI CJ CK CL CM CN     ? Mobility - Walking and Moving Around:     - CURRENT STATUS: CK - 40%-59% impaired, limited or restricted    - GOAL STATUS: CJ - 20%-39% impaired, limited or restricted    - D/C STATUS:  ---------------To be determined---------------    Medical Necessity:   · Patient demonstrates good rehab potential due to higher previous functional level. Reason for Services/Other Comments:  · Patient continues to require present interventions due to patient's inability to walk normally and exercise. TREATMENT:   (In addition to Assessment/Re-Assessment sessions the following treatments were rendered)  THERAPEUTIC ACTIVITY: ( see below for minutes): Therapeutic activities per grid below to improve mobility. Required moderate verbal and manual cues to promote motor control of right, lower extremity(s). THERAPEUTIC EXERCISE: (see below for minutes):  Exercises per grid below to improve strength. Required moderate verbal and manual cues to promote proper body alignment, promote proper body posture and promote proper body mechanics. Progressed resistance, range, repetitions and complexity of movement as indicated. MANUAL THERAPY: (see below for minutes): Soft tissue mobilization was utilized and necessary because of the patient's restricted motion of soft tissue. MODALITIES: (see below for minutes):      for pain modulation     Date: 9/27/18 10/2/18  Visit 2 10/4/18  Visit 3 10/9/18  Visit 4 10/11/18  Visit 5      Modalities:                                            Therapeutic Exercise: 15 mins 25 mins 25 mins 20 mins 20 mins      SLR X 10 X 20 X 30 X 30 X 30      S/L hip abd X 10 X 20 X 30 X 30 X 30      clams X 10 X 30 X 30 X 30 X 30      Prone hip ext B X 10 X 30 X 30 X 30 X 30      PKF X 10 X 30 X 30 X 30 X 30      Slant board bent and straight knee   5 x 10 sec 5 x 10 sec 5 x 10 sec 5 x 10 sec                            Proprioceptive Activities:                                            Manual Therapy:                                 Therapeutic Activities: 15 mins 15 mins 20 mins 25 mins 25 mins      Pt education, postural education, HEP 10 mins Review of biomechanics         Bridging without hams, paraspinals X 10 X 30 X 30 X 30 X 30      Sit to stand without UE support  X 20 X 30 X 30 X 30      SLS   X 2 X 3  X 3      Lat stepping   X 2 laps X 4 laps X 4 laps      Step ups lat    4 inches  X 30 4 inches   X 30        HEP: see hand out  [email or web address] Portal  Treatment/Session Assessment:  Pt needs cues for all ex's. Discussed possible early stages of dementia per pt's concerns of not remembering things more frequently. She will discuss with her primary care physician.       · Pain/ Symptoms: Initial:   1/10 it is just tight behind the knee. Post Session:  1/10 ·   Compliance with Program/Exercises: Will assess as treatment progresses. · Recommendations/Intent for next treatment session: \"Next visit will focus on advancements to more challenging activities\".   Total Treatment Duration:  PT Patient Time In/Time Out  Time In: 0930  Time Out: 1015     Thomas Gaming, PT

## 2018-10-16 ENCOUNTER — HOSPITAL ENCOUNTER (OUTPATIENT)
Dept: PHYSICAL THERAPY | Age: 73
Discharge: HOME | End: 2018-10-16
Payer: MEDICARE

## 2018-10-16 NOTE — PROGRESS NOTES
Pt cancelled the next appts as she is going OOT to help care for a new granddaughter in [address] [facility]. She will return in November. She will continue her HEP while she is gone and contact me with any questions/concerns.

## 2018-10-18 ENCOUNTER — HOSPITAL ENCOUNTER (OUTPATIENT)
Dept: PHYSICAL THERAPY | Age: 73
Discharge: HOME | End: 2018-10-18
Payer: MEDICARE

## 2018-10-23 ENCOUNTER — APPOINTMENT (OUTPATIENT)
Dept: PHYSICAL THERAPY | Age: 73
End: 2018-10-23
Payer: MEDICARE

## 2018-10-25 ENCOUNTER — APPOINTMENT (OUTPATIENT)
Dept: PHYSICAL THERAPY | Age: 73
End: 2018-10-25
Payer: MEDICARE

## 2018-10-30 ENCOUNTER — APPOINTMENT (OUTPATIENT)
Dept: PHYSICAL THERAPY | Age: 73
End: 2018-10-30
Payer: MEDICARE

## 2018-11-01 ENCOUNTER — HOSPITAL ENCOUNTER (OUTPATIENT)
Dept: PHYSICAL THERAPY | Age: 73
Discharge: HOME | End: 2018-11-01
Payer: MEDICARE

## 2018-11-01 PROCEDURE — 97530 THERAPEUTIC ACTIVITIES: CPT

## 2018-11-01 PROCEDURE — 97110 THERAPEUTIC EXERCISES: CPT

## 2018-11-01 NOTE — PROGRESS NOTES
Adrienne Patient  : 1945 [id number] [facility]  [address] [place] [address].  [phone]   [phone]        OUTPATIENT PHYSICAL THERAPY:Daily Note 2018         ICD-10: Treatment Diagnosis: Muscle weakness (generalized) (M62.81)Pain in right knee (M25.561)  Precautions/Allergies: Other medication and Sulfa (sulfonamide antibiotics)   Fall Risk Score: 1 (? 5 = High Risk)  MD Orders: eval and treat MEDICAL/REFERRING DIAGNOSIS:  Knee pain [M25.569]   DATE OF ONSET: a few months ago  REFERRING PHYSICIAN: Guillermina Lucio MD  RETURN PHYSICIAN APPOINTMENT: 10/29/18     INITIAL ASSESSMENT:  Ms. Mayra Noble presents with pain, stiffness, weakness in right knee that will benefit from skilled PT to restore motion, strength and normalize gait to resume premorbid function. PROBLEM LIST (Impacting functional limitations):  1. Decreased Strength  2. Decreased ADL/Functional Activities  3. Decreased Transfer Abilities  4. Decreased Ambulation Ability/Technique  5. Decreased Balance  6. Increased Pain  7. Decreased Activity Tolerance  8. Decreased Flexibility/Joint Mobility  9. Decreased [place] with Home Exercise Program INTERVENTIONS PLANNED:  1. Home Exercise Program (HEP)  2. Manual Therapy  3. Therapeutic Activites  4. Therapeutic Exercise/Strengthening    TREATMENT PLAN:  Effective Dates: 2018 TO 2018 (90 days). Frequency/Duration: 2 times a week for 90 Days  GOALS: (Goals have been discussed and agreed upon with patient.)  Short-Term Functional Goals: Time Frame: 2 weeks  1. Pt to report compliance with HEP. 2. Pt to demonstrate full knee motion without cueing. Discharge Goals: Time Frame: 8 weeks  1. Pt to increase strength to 5/5 knee ext for normal gait level surfaces. 2. Pt to increase hip abd strength to > 4/5 for reciprocal stair amb. 3. Pt to decrease pain to allow her to resume her previous ex level. HISTORY:   History of Present Injury/Illness (Reason for Referral):  Pt reports right knee pain she attributes to slipping and pulling knee in April. She then states her knee gave way walking in her house a few weeks ago. She has OA but doesn't think that is the source of her pain. She got a cortisone shot which helped. She cannot walk evenly or do her normal exercise routine because of the pain. Also causing back and hip pain from altered gait. Past Medical History/Comorbidities:   Ms. Jacqui Villavicencio  has no past medical history on file. Ms. Jacqui Villavicencio  has a past surgical history that includes hx bartholin cyst marsupialization; hx gyn; and hx other surgical.  Social History/Living Environment:     2 story home but doesn't use upstairs  Prior Level of Function/Work/Activity:  retired  Dominant Side:         RIGHT  Current Medications:    Current Outpatient Medications:     OTHER,NON-FORMULARY,, THYROID HEALTH, Disp: , Rfl:     OTHER,NON-FORMULARY,, BLACK CURRANT SEED  MG, Disp: , Rfl:     OTHER,NON-FORMULARY,, VITAL SCREEN 360, Disp: , Rfl:     OTHER,NON-FORMULARY,, HERBAL AMINO ACID SUPPLEMENT, Disp: , Rfl:     OTHER,NON-FORMULARY,, HEMP EXTRACT SOFTGELS 10MG, Disp: , Rfl:     OTHER,NON-FORMULARY,, BIOLIQUE FX, Disp: , Rfl:     OTHER,NON-FORMULARY,, PRIMAL PLANTS SUPERFOODS BLEND, Disp: , Rfl:     OTHER,NON-FORMULARY,, LECTIN SHIELD, Disp: , Rfl:     OTHER,NON-FORMULARY,, IONIC-FIZZ MAGNESIUM PLUS, Disp: , Rfl:     OTHER,NON-FORMULARY,, COGNISENSE, Disp: , Rfl:     magnesium 250 mg tab, Take  by mouth., Disp: , Rfl:     Omega-3 Fatty Acids 60- mg cpDR, Take  by mouth., Disp: , Rfl:     cholecalciferol, vitamin D3, (VITAMIN D3) 2,000 unit tab, Take  by mouth., Disp: , Rfl:    Date Last Reviewed:  11/1/2018   EXAMINATION:   Observation/Orthostatic Postural Assessment:          Stiff, antalgic gait.   Holds knee flexed  Palpation:          Mild crepitus  ROM:     AROM  Right knee flex-ext 130-0, left 133-0  Right > left gastroc tightness  10/11/18      Strength:     3/5 hip abd, ext bilaterally  3/5 right knee ext, 5/5 left      Special Tests:          unremarkable  Neurological Screen:        unremarkable  Functional Mobility:         Gait/Ambulation:  Antalgic         Stairs:  Step-to pattern  Balance:          Unable to assess due to pain   Body Structures Involved:  1. Joints  2. Muscles Body Functions Affected:  1. Sensory/Pain  2. Neuromusculoskeletal  3. Movement Related Activities and Participation Affected:  1. General Tasks and Demands  2. Mobility  3. Self Care  4. Domestic Life  5. Community, Social and Civic Life   CLINICAL PRESENTATION:   CLINICAL DECISION MAKING:   Outcome Measure: Tool Used: Lower Extremity Functional Scale (LEFS)  Score:  Initial: 42/80 Most Recent: X/80 (Date: -- )   Interpretation of Score: 20 questions each scored on a 5 point scale with 0 representing \"extreme difficulty or unable to perform\" and 4 representing \"no difficulty\". The lower the score, the greater the functional disability. 80/80 represents no disability. Minimal detectable change is 9 points. Score 80 79-63 62-48 47-32 31-16 15-1 0   Modifier CH CI CJ CK CL CM CN     ? Mobility - Walking and Moving Around:     - CURRENT STATUS: CK - 40%-59% impaired, limited or restricted    - GOAL STATUS: CJ - 20%-39% impaired, limited or restricted    - D/C STATUS:  ---------------To be determined---------------    Medical Necessity:   · Patient demonstrates good rehab potential due to higher previous functional level. Reason for Services/Other Comments:  · Patient continues to require present interventions due to patient's inability to walk normally and exercise. TREATMENT:   (In addition to Assessment/Re-Assessment sessions the following treatments were rendered)  THERAPEUTIC ACTIVITY: ( see below for minutes): Therapeutic activities per grid below to improve mobility. Required moderate verbal and manual cues to promote motor control of right, lower extremity(s). THERAPEUTIC EXERCISE: (see below for minutes):  Exercises per grid below to improve strength. Required moderate verbal and manual cues to promote proper body alignment, promote proper body posture and promote proper body mechanics. Progressed resistance, range, repetitions and complexity of movement as indicated. MANUAL THERAPY: (see below for minutes): Soft tissue mobilization was utilized and necessary because of the patient's restricted motion of soft tissue. MODALITIES: (see below for minutes):      for pain modulation     Date: 9/27/18 10/2/18  Visit 2 10/4/18  Visit 3 10/9/18  Visit 4 10/11/18  Visit 5 11/1/18  Visit 6     Modalities:                                            Therapeutic Exercise: 15 mins 25 mins 25 mins 20 mins 20 mins 20 mins     SLR X 10 X 20 X 30 X 30 X 30 X 30     S/L hip abd X 10 X 20 X 30 X 30 X 30 X 30     clams X 10 X 30 X 30 X 30 X 30 X 30     Prone hip ext B X 10 X 30 X 30 X 30 X 30 X 30     PKF X 10 X 30 X 30 X 30 X 30 X 30     Slant board bent and straight knee   5 x 10 sec 5 x 10 sec 5 x 10 sec 5 x 10 sec 5 x 10 sec                           Proprioceptive Activities:                                            Manual Therapy:                                 Therapeutic Activities: 15 mins 15 mins 20 mins 25 mins 25 mins 25 mins     Pt education, postural education, HEP 10 mins Review of biomechanics         Bridging without hams, paraspinals X 10 X 30 X 30 X 30 X 30 X 30     Sit to stand without UE support  X 20 X 30 X 30 X 30 Squats  X 30     SLS   X 2 X 3  X 3 X 3     Lat stepping   X 2 laps X 4 laps X 4 laps 4 laps     Step ups lat    4 inches  X 30 4 inches   X 30   4 inches  X 30     stairs      X 2 cycles                HEP: see hand out  [employer] Portal  Treatment/Session Assessment:  Pt still requires consistent cues for correct ex performance. Doing very well overall. · Pain/ Symptoms: Initial:  .0/10 I did great on my trip and did a lot of walking with no problems. I was pleasantly surprised. I had one day where I had to stop and stretch it but it was cold outside that day. I didn't do my ex's while we were up there I will admit. Post Session:  0/10 ·   Compliance with Program/Exercises: Will assess as treatment progresses. · Recommendations/Intent for next treatment session: \"Next visit will focus on advancements to more challenging activities\".   Total Treatment Duration:  PT Patient Time In/Time Out  Time In: 1100  Time Out: Moncho Pena PT

## 2018-11-06 ENCOUNTER — HOSPITAL ENCOUNTER (OUTPATIENT)
Dept: PHYSICAL THERAPY | Age: 73
Discharge: HOME | End: 2018-11-06
Payer: MEDICARE

## 2018-11-08 ENCOUNTER — HOSPITAL ENCOUNTER (OUTPATIENT)
Dept: PHYSICAL THERAPY | Age: 73
Discharge: HOME | End: 2018-11-08
Payer: MEDICARE

## 2018-11-13 ENCOUNTER — APPOINTMENT (OUTPATIENT)
Dept: PHYSICAL THERAPY | Age: 73
End: 2018-11-13
Payer: MEDICARE

## 2018-11-13 NOTE — THERAPY DISCHARGE
Mark Erickson   (KFK:[birth year]) [address] [facility]  [address] [place] [address].  [phone]   [phone]           Discontinuation summary    REFERRING PHYSICIAN: Brie Briseno MD  Return Physician Appointment: unknown  MEDICAL/REFERRING DIAGNOSIS:  · Knee pain [M25.569]  ATTENDANCE: Mark Erickson has attended 6 out of 10 visits, with 4 cancellation(s) and 0 no shows. ASSESSMENT:  DATE: 11/13/2018    PROGRESS: Mark Erickson was showing good progress with activity tolerance and little pain. She was out of town and then developed a sinus infection that was not improving so she cancelled all remaining appts until she feels better. RECOMMENDATIONS: Pt has been discharged from therapy to continue with her HEP.       Thank you for this referral,    Josue Gaming, PT

## 2018-11-15 ENCOUNTER — APPOINTMENT (OUTPATIENT)
Dept: PHYSICAL THERAPY | Age: 73
End: 2018-11-15
Payer: MEDICARE

## 2018-11-20 ENCOUNTER — APPOINTMENT (OUTPATIENT)
Dept: PHYSICAL THERAPY | Age: 73
End: 2018-11-20
Payer: MEDICARE

## 2018-11-27 ENCOUNTER — APPOINTMENT (OUTPATIENT)
Dept: PHYSICAL THERAPY | Age: 73
End: 2018-11-27
Payer: MEDICARE

## 2018-11-29 ENCOUNTER — APPOINTMENT (OUTPATIENT)
Dept: PHYSICAL THERAPY | Age: 73
End: 2018-11-29
Payer: MEDICARE

## 2018-12-19 ENCOUNTER — TELEPHONE (OUTPATIENT)
Dept: CASE MANAGEMENT | Age: 73
End: 2018-12-19

## 2019-10-29 ENCOUNTER — HOSPITAL ENCOUNTER (OUTPATIENT)
Dept: ULTRASOUND IMAGING | Age: 74
Discharge: HOME | End: 2019-10-29
Attending: INTERNAL MEDICINE
Payer: MEDICARE

## 2019-10-29 DIAGNOSIS — R42 VERTIGO: ICD-10-CM

## 2019-10-29 DIAGNOSIS — R42 DIZZINESS: ICD-10-CM

## 2019-10-29 PROCEDURE — 93880 EXTRACRANIAL BILAT STUDY: CPT

## 2019-12-14 ENCOUNTER — HOSPITAL ENCOUNTER (OUTPATIENT)
Dept: MRI IMAGING | Age: 74
Discharge: HOME | End: 2019-12-14
Attending: INTERNAL MEDICINE
Payer: MEDICARE

## 2019-12-14 DIAGNOSIS — R20.0 LEFT SIDED NUMBNESS: ICD-10-CM

## 2019-12-14 DIAGNOSIS — R41.3 MEMORY LOSS: ICD-10-CM

## 2019-12-14 DIAGNOSIS — R42 DIZZINESS: ICD-10-CM

## 2019-12-14 PROCEDURE — 70551 MRI BRAIN STEM W/O DYE: CPT

## 2021-05-06 ENCOUNTER — HOSPITAL ENCOUNTER (OUTPATIENT)
Dept: ULTRASOUND IMAGING | Age: 76
Discharge: HOME | End: 2021-05-06
Attending: INTERNAL MEDICINE
Payer: MEDICARE

## 2021-05-06 DIAGNOSIS — R10.10 PAIN OF UPPER ABDOMEN: ICD-10-CM

## 2021-05-06 PROCEDURE — 76700 US EXAM ABDOM COMPLETE: CPT

## 2021-05-13 NOTE — PROGRESS NOTES
I talked with the patient's  regarding Abnormal abd U/S results he will talk with Zoey Rater and call back.

## 2022-04-18 PROBLEM — F02.80 ALZHEIMER'S DEMENTIA OF OTHER ONSET WITHOUT BEHAVIORAL DISTURBANCE: Status: ACTIVE | Noted: 2022-04-18

## 2022-04-18 PROBLEM — G30.8 ALZHEIMER'S DEMENTIA OF OTHER ONSET WITHOUT BEHAVIORAL DISTURBANCE: Status: ACTIVE | Noted: 2022-04-18

## 2022-06-09 DIAGNOSIS — G30.8 ALZHEIMER'S DEMENTIA OF OTHER ONSET WITHOUT BEHAVIORAL DISTURBANCE: Primary | ICD-10-CM

## 2022-06-09 DIAGNOSIS — F02.80 ALZHEIMER'S DEMENTIA OF OTHER ONSET WITHOUT BEHAVIORAL DISTURBANCE: Primary | ICD-10-CM

## 2022-06-09 DIAGNOSIS — E78.2 MIXED HYPERLIPIDEMIA: ICD-10-CM

## 2022-06-09 DIAGNOSIS — E03.9 HYPOTHYROIDISM, UNSPECIFIED TYPE: ICD-10-CM

## 2022-10-06 ENCOUNTER — OFFICE VISIT (OUTPATIENT)
Dept: INTERNAL MEDICINE CLINIC | Facility: CLINIC | Age: 77
End: 2022-10-06
Payer: MEDICARE

## 2022-10-06 VITALS
WEIGHT: 167 LBS | DIASTOLIC BLOOD PRESSURE: 88 MMHG | BODY MASS INDEX: 26.21 KG/M2 | OXYGEN SATURATION: 99 % | HEIGHT: 67 IN | HEART RATE: 68 BPM | SYSTOLIC BLOOD PRESSURE: 138 MMHG

## 2022-10-06 DIAGNOSIS — F02.C0 SEVERE ALZHEIMER'S DEMENTIA OF OTHER ONSET WITHOUT BEHAVIORAL DISTURBANCE, PSYCHOTIC DISTURBANCE, MOOD DISTURBANCE, OR ANXIETY (HCC): ICD-10-CM

## 2022-10-06 DIAGNOSIS — G30.8 SEVERE ALZHEIMER'S DEMENTIA OF OTHER ONSET WITHOUT BEHAVIORAL DISTURBANCE, PSYCHOTIC DISTURBANCE, MOOD DISTURBANCE, OR ANXIETY (HCC): ICD-10-CM

## 2022-10-06 DIAGNOSIS — Z00.00 MEDICARE ANNUAL WELLNESS VISIT, SUBSEQUENT: Primary | ICD-10-CM

## 2022-10-06 DIAGNOSIS — R42 VERTIGO: ICD-10-CM

## 2022-10-06 PROBLEM — G30.9 ALZHEIMER'S DEMENTIA (HCC): Status: ACTIVE | Noted: 2022-04-18

## 2022-10-06 PROBLEM — F02.80 ALZHEIMER'S DEMENTIA (HCC): Status: ACTIVE | Noted: 2022-04-18

## 2022-10-06 PROCEDURE — 99214 OFFICE O/P EST MOD 30 MIN: CPT | Performed by: INTERNAL MEDICINE

## 2022-10-06 PROCEDURE — 1090F PRES/ABSN URINE INCON ASSESS: CPT | Performed by: INTERNAL MEDICINE

## 2022-10-06 PROCEDURE — 1123F ACP DISCUSS/DSCN MKR DOCD: CPT | Performed by: INTERNAL MEDICINE

## 2022-10-06 PROCEDURE — 1036F TOBACCO NON-USER: CPT | Performed by: INTERNAL MEDICINE

## 2022-10-06 PROCEDURE — G8400 PT W/DXA NO RESULTS DOC: HCPCS | Performed by: INTERNAL MEDICINE

## 2022-10-06 PROCEDURE — G8419 CALC BMI OUT NRM PARAM NOF/U: HCPCS | Performed by: INTERNAL MEDICINE

## 2022-10-06 PROCEDURE — G0439 PPPS, SUBSEQ VISIT: HCPCS | Performed by: INTERNAL MEDICINE

## 2022-10-06 PROCEDURE — G8427 DOCREV CUR MEDS BY ELIG CLIN: HCPCS | Performed by: INTERNAL MEDICINE

## 2022-10-06 PROCEDURE — G8484 FLU IMMUNIZE NO ADMIN: HCPCS | Performed by: INTERNAL MEDICINE

## 2022-10-06 RX ORDER — PANTOPRAZOLE SODIUM 40 MG/1
40 TABLET, DELAYED RELEASE ORAL DAILY
Qty: 30 TABLET | Refills: 5 | Status: SHIPPED | OUTPATIENT
Start: 2022-10-06

## 2022-10-06 RX ORDER — MECLIZINE HCL 12.5 MG/1
12.5 TABLET ORAL 3 TIMES DAILY PRN
Qty: 15 TABLET | Refills: 2 | Status: SHIPPED | OUTPATIENT
Start: 2022-10-06 | End: 2022-10-16

## 2022-10-06 ASSESSMENT — PATIENT HEALTH QUESTIONNAIRE - PHQ9
SUM OF ALL RESPONSES TO PHQ QUESTIONS 1-9: 0
1. LITTLE INTEREST OR PLEASURE IN DOING THINGS: 0
2. FEELING DOWN, DEPRESSED OR HOPELESS: 0
SUM OF ALL RESPONSES TO PHQ9 QUESTIONS 1 & 2: 0
SUM OF ALL RESPONSES TO PHQ QUESTIONS 1-9: 0

## 2022-10-06 ASSESSMENT — LIFESTYLE VARIABLES
HOW MANY STANDARD DRINKS CONTAINING ALCOHOL DO YOU HAVE ON A TYPICAL DAY: PATIENT DOES NOT DRINK
HOW OFTEN DO YOU HAVE A DRINK CONTAINING ALCOHOL: NEVER

## 2022-10-06 NOTE — PROGRESS NOTES
Virgen Cantrell was seen today for dizziness, medicare awv and stress. Diagnoses and all orders for this visit:    Medicare annual wellness visit, subsequent    Severe Alzheimer's dementia of other onset without behavioral disturbance, psychotic disturbance, mood disturbance, or anxiety (HCC)    Vertigo  -     meclizine (ANTIVERT) 12.5 MG tablet; Take 1 tablet by mouth 3 times daily as needed for Dizziness    Other orders  -     pantoprazole (PROTONIX) 40 MG tablet; Take 1 tablet by mouth daily        Lisa Mcknight is a 68 y.o. female    Chief Complaint   Patient presents with    Dizziness    Medicare AWV    Stress   Feel hot forehead  Some runny nose    In bed room spinning after rolling over in bed  Poor historian   Some stress is unable to drive      Orders Only on 04/11/2022   Component Date Value Ref Range Status    TSH 04/11/2022 3.500  0.450 - 4.500 uIU/mL Final        History reviewed. No pertinent past medical history. Family History   Problem Relation Age of Onset    Cancer Mother         stomach-liver    Breast Cancer Neg Hx         Social History     Socioeconomic History    Marital status:      Spouse name: Not on file    Number of children: Not on file    Years of education: Not on file    Highest education level: Not on file   Occupational History    Not on file   Tobacco Use    Smoking status: Never    Smokeless tobacco: Never   Substance and Sexual Activity    Alcohol use:  Yes     Alcohol/week: 0.0 standard drinks    Drug use: No    Sexual activity: Not on file   Other Topics Concern    Not on file   Social History Narrative    Not on file     Social Determinants of Health     Financial Resource Strain: Not on file   Food Insecurity: Not on file   Transportation Needs: Not on file   Physical Activity: Insufficiently Active    Days of Exercise per Week: 7 days    Minutes of Exercise per Session: 10 min   Stress: Not on file   Social Connections: Not on file   Intimate Partner Violence: Not on file   Housing Stability: Not on file         Current Outpatient Medications:     NONFORMULARY, Lion's tegan mushroom, Disp: , Rfl:     triamcinolone (KENALOG) 0.1 % ointment, , Disp: , Rfl:     meclizine (ANTIVERT) 12.5 MG tablet, Take 1 tablet by mouth 3 times daily as needed for Dizziness, Disp: 15 tablet, Rfl: 2    pantoprazole (PROTONIX) 40 MG tablet, Take 1 tablet by mouth daily, Disp: 30 tablet, Rfl: 5    RESVERATROL PO, Take 1,450 mg by mouth, Disp: , Rfl:     ascorbic acid (VITAMIN C) 1000 MG tablet, Take by mouth, Disp: , Rfl:     Cholecalciferol 50 MCG (2000 UT) TABS, Take by mouth, Disp: , Rfl:     donepezil (ARICEPT) 10 MG tablet, Take 10 mg by mouth, Disp: , Rfl:     levothyroxine (SYNTHROID) 50 MCG tablet, Take 50 mcg by mouth every morning (before breakfast), Disp: , Rfl:     memantine (NAMENDA) 5 MG tablet, Take 5 mg by mouth daily, Disp: , Rfl:     Allergies   Allergen Reactions    Other Swelling     Novocain     Sulfa Antibiotics Other (See Comments)     Makes her feel bad         Review of Systems      Vitals:    10/06/22 1352   BP: 138/88   Pulse: 68   SpO2: 99%   Weight: 167 lb (75.8 kg)   Height: 5' 7\" (1.702 m)           Physical Exam       Lachelle Díaz was seen today for dizziness, medicare awv and stress. Diagnoses and all orders for this visit:    Medicare annual wellness visit, subsequent    Severe Alzheimer's dementia of other onset without behavioral disturbance, psychotic disturbance, mood disturbance, or anxiety ([facility])    Vertigo  -     meclizine (ANTIVERT) 12.5 MG tablet; Take 1 tablet by mouth 3 times daily as needed for Dizziness    Other orders  -     pantoprazole (PROTONIX) 40 MG tablet;  Take 1 tablet by mouth daily               Froy Sit, DO    Medicare Annual Wellness Visit    Sondramckenna Montanojuanita is here for Dizziness, Medicare AWV, and Stress    Assessment & Plan   Medicare annual wellness visit, subsequent  Severe Alzheimer's dementia of other onset without behavioral disturbance, psychotic disturbance, mood disturbance, or anxiety (HCC)  Vertigo  -     meclizine (ANTIVERT) 12.5 MG tablet; Take 1 tablet by mouth 3 times daily as needed for Dizziness, Disp-15 tablet, R-2Normal    Recommendations for Preventive Services Due: see orders and patient instructions/AVS.  Recommended screening schedule for the next 5-10 years is provided to the patient in written form: see Patient Instructions/AVS.     Return for Medicare Annual Wellness Visit in 1 year. Subjective   The following acute and/or chronic problems were also addressed today:  Sari Hernandez was seen today for dizziness, medicare awv and stress. Diagnoses and all orders for this visit:    Medicare annual wellness visit, subsequent    Severe Alzheimer's dementia of other onset without behavioral disturbance, psychotic disturbance, mood disturbance, or anxiety (HCC)    Vertigo  -     meclizine (ANTIVERT) 12.5 MG tablet; Take 1 tablet by mouth 3 times daily as needed for Dizziness    Other orders  -     pantoprazole (PROTONIX) 40 MG tablet; Take 1 tablet by mouth daily       Patient's complete Health Risk Assessment and screening values have been reviewed and are found in Flowsheets. The following problems were reviewed today and where indicated follow up appointments were made and/or referrals ordered. Positive Risk Factor Screenings with Interventions:    Fall Risk:  Do you feel unsteady or are you worried about falling? : (!) yes (in today with dizziness)  2 or more falls in past year?: no  Fall with injury in past year?: no   Fall Risk Interventions:    Home safety tips provided    Cognitive:   Words recalled: 0 Words Recalled  Clock Drawing Test (CDT): (!) Abnormal  Total Score Interpretation: Abnormal Mini-Cog  Did the patient refuse to take the cognition test?: No  Cognitive Impairment Interventions:  Patient declines any further evaluation/treatment for cognitive impairment               ADLs:  In the past 7 days, did you need help from others to perform any of the following everyday activities: Eating, dressing, grooming, bathing, toileting, or walking/balance?: (!) Yes  Select all that apply: (!) Eating  In the past 7 days, did you need help from others to take care of any of the following: Laundry, housekeeping, banking/finances, shopping, telephone use, food preparation, transportation, or taking medications?: (!) Yes  Select all that apply: (!) Taking Medications, Banking/Finances, Shopping, Transportation  ADL Interventions:  Patient declines any further evaluation/treatment for this issue          Objective   Vitals:    10/06/22 1352   BP: 138/88   Pulse: 68   SpO2: 99%   Weight: 167 lb (75.8 kg)   Height: 5' 7\" (1.702 m)      Body mass index is 26.16 kg/m². Allergies   Allergen Reactions    Other Swelling     Novocain     Sulfa Antibiotics Other (See Comments)     Makes her feel bad     Prior to Visit Medications    Medication Sig Taking?  Authorizing Provider   NONFORMULARY Lion's tegan mushroom Yes Historical Provider, MD   triamcinolone (KENALOG) 0.1 % ointment  Yes Historical Provider, MD   meclizine (ANTIVERT) 12.5 MG tablet Take 1 tablet by mouth 3 times daily as needed for Dizziness Yes Raymond Vu,    pantoprazole (PROTONIX) 40 MG tablet Take 1 tablet by mouth daily Yes Raymond Vu,    RESVERATROL PO Take 1,450 mg by mouth Yes Ar Automatic Reconciliation   ascorbic acid (VITAMIN C) 1000 MG tablet Take by mouth Yes Ar Automatic Reconciliation   Cholecalciferol 50 MCG (2000 UT) TABS Take by mouth Yes Ar Automatic Reconciliation   donepezil (ARICEPT) 10 MG tablet Take 10 mg by mouth Yes Ar Automatic Reconciliation   levothyroxine (SYNTHROID) 50 MCG tablet Take 50 mcg by mouth every morning (before breakfast) Yes Ar Automatic Reconciliation   memantine (NAMENDA) 5 MG tablet Take 5 mg by mouth daily Yes Ar Automatic Reconciliation       CareTeam (Including outside providers/suppliers regularly involved in providing care):   Patient Care Team:  Berry Caba DO as PCP - [address] [place], [place] [address]DO as PCP - [facility] Empaneled Provider     Reviewed and updated this visit:  Tobacco  Allergies  Meds  Problems  Med Hx  Surg Hx  Soc Hx  Fam Hx

## 2022-10-06 NOTE — PATIENT INSTRUCTIONS
Personalized Preventive Plan for Lizette De Leon - 10/6/2022  Medicare offers a range of preventive health benefits. Some of the tests and screenings are paid in full while other may be subject to a deductible, co-insurance, and/or copay. Some of these benefits include a comprehensive review of your medical history including lifestyle, illnesses that may run in your family, and various assessments and screenings as appropriate. After reviewing your medical record and screening and assessments performed today your provider may have ordered immunizations, labs, imaging, and/or referrals for you. A list of these orders (if applicable) as well as your Preventive Care list are included within your After Visit Summary for your review. Other Preventive Recommendations:    A preventive eye exam performed by an eye specialist is recommended every 1-2 years to screen for glaucoma; cataracts, macular degeneration, and other eye disorders. A preventive dental visit is recommended every 6 months. Try to get at least 150 minutes of exercise per week or 10,000 steps per day on a pedometer . Order or download the FREE \"Exercise & Physical Activity: Your Everyday Guide\" from [employer]. Call 1-453.418.5404 or search [employer] online. You need 8235-4129 mg of calcium and 2343-2270 IU of vitamin D per day. It is possible to meet your calcium requirement with diet alone, but a vitamin D supplement is usually necessary to meet this goal.  When exposed to the sun, use a sunscreen that protects against both UVA and UVB radiation with an SPF of 30 or greater. Reapply every 2 to 3 hours or after sweating, drying off with a towel, or swimming. Always wear a seat belt when traveling in a car. Always wear a helmet when riding a bicycle or motorcycle.

## 2022-10-25 RX ORDER — MEMANTINE HYDROCHLORIDE 5 MG/1
5 TABLET ORAL DAILY
Qty: 30 TABLET | Refills: 11 | Status: SHIPPED | OUTPATIENT
Start: 2022-10-25

## 2022-12-20 ENCOUNTER — TELEPHONE (OUTPATIENT)
Dept: INTERNAL MEDICINE CLINIC | Facility: CLINIC | Age: 77
End: 2022-12-20

## 2022-12-20 RX ORDER — LEVOTHYROXINE SODIUM 0.05 MG/1
50 TABLET ORAL
Qty: 30 TABLET | Refills: 5 | Status: CANCELLED | OUTPATIENT
Start: 2022-12-20

## 2022-12-20 NOTE — TELEPHONE ENCOUNTER
Patients  called and would like to know if the patient can get a refill on her levothyroxine (SYNTHROID) 50 MCG tablet called in for her. Patients  confirmed the pharmacy. Please Advise.      [facility] [place]

## 2022-12-27 RX ORDER — LEVOTHYROXINE SODIUM 0.05 MG/1
50 TABLET ORAL
Qty: 90 TABLET | Refills: 1 | Status: SHIPPED | OUTPATIENT
Start: 2022-12-27

## 2023-01-01 ENCOUNTER — HOSPICE ADMISSION (OUTPATIENT)
Dept: HOSPICE | Facility: HOSPICE | Age: 78
End: 2023-01-01
Payer: MEDICARE

## 2023-01-01 ENCOUNTER — HOME CARE VISIT (OUTPATIENT)
Dept: SCHEDULING | Facility: HOME HEALTH | Age: 78
End: 2023-01-01
Payer: MEDICARE

## 2023-01-01 ENCOUNTER — HOME CARE VISIT (OUTPATIENT)
Dept: HOSPICE | Facility: HOSPICE | Age: 78
End: 2023-01-01
Payer: MEDICARE

## 2023-01-01 ENCOUNTER — TELEPHONE (OUTPATIENT)
Dept: INTERNAL MEDICINE CLINIC | Facility: CLINIC | Age: 78
End: 2023-01-01

## 2023-01-01 VITALS
TEMPERATURE: 97.2 F | RESPIRATION RATE: 16 BRPM | HEART RATE: 87 BPM | SYSTOLIC BLOOD PRESSURE: 124 MMHG | DIASTOLIC BLOOD PRESSURE: 86 MMHG

## 2023-01-01 DIAGNOSIS — G89.29 CHRONIC MIDLINE THORACIC BACK PAIN: ICD-10-CM

## 2023-01-01 DIAGNOSIS — M54.6 CHRONIC MIDLINE THORACIC BACK PAIN: ICD-10-CM

## 2023-01-01 PROCEDURE — G0156 HHCP-SVS OF AIDE,EA 15 MIN: HCPCS

## 2023-01-01 PROCEDURE — 2500000001 HSPC NON INJECTABLE MED

## 2023-01-01 PROCEDURE — 0651 HSPC ROUTINE HOME CARE

## 2023-01-01 PROCEDURE — G0299 HHS/HOSPICE OF RN EA 15 MIN: HCPCS

## 2023-01-01 PROCEDURE — G0155 HHCP-SVS OF CSW,EA 15 MIN: HCPCS

## 2023-01-01 PROCEDURE — 3331090004 HSPC SERVICE INTENSITY ADD-ON

## 2023-01-01 ASSESSMENT — ENCOUNTER SYMPTOMS
SNORING: 1
DYSPNEA ACTIVITY LEVEL: AT REST
HEMOPTYSIS: 0
NAUSEA: 1

## 2023-01-16 RX ORDER — DONEPEZIL HYDROCHLORIDE 10 MG/1
10 TABLET, FILM COATED ORAL DAILY
Qty: 90 TABLET | Refills: 3 | Status: SHIPPED | OUTPATIENT
Start: 2023-01-16

## 2023-02-20 ENCOUNTER — OFFICE VISIT (OUTPATIENT)
Dept: INTERNAL MEDICINE CLINIC | Facility: CLINIC | Age: 78
End: 2023-02-20
Payer: MEDICARE

## 2023-02-20 VITALS
SYSTOLIC BLOOD PRESSURE: 138 MMHG | WEIGHT: 170 LBS | OXYGEN SATURATION: 97 % | BODY MASS INDEX: 26.68 KG/M2 | HEART RATE: 68 BPM | HEIGHT: 67 IN | DIASTOLIC BLOOD PRESSURE: 80 MMHG

## 2023-02-20 DIAGNOSIS — Z00.00 MEDICARE ANNUAL WELLNESS VISIT, SUBSEQUENT: ICD-10-CM

## 2023-02-20 DIAGNOSIS — G31.09 FRONTOTEMPORAL DEMENTIA (HCC): Primary | ICD-10-CM

## 2023-02-20 DIAGNOSIS — F02.80 FRONTOTEMPORAL DEMENTIA (HCC): Primary | ICD-10-CM

## 2023-02-20 DIAGNOSIS — G30.8 SEVERE ALZHEIMER'S DEMENTIA OF OTHER ONSET WITHOUT BEHAVIORAL DISTURBANCE, PSYCHOTIC DISTURBANCE, MOOD DISTURBANCE, OR ANXIETY (HCC): ICD-10-CM

## 2023-02-20 DIAGNOSIS — I67.9 CEREBROVASCULAR DISEASE, UNSPECIFIED: ICD-10-CM

## 2023-02-20 DIAGNOSIS — F02.C0 SEVERE ALZHEIMER'S DEMENTIA OF OTHER ONSET WITHOUT BEHAVIORAL DISTURBANCE, PSYCHOTIC DISTURBANCE, MOOD DISTURBANCE, OR ANXIETY (HCC): ICD-10-CM

## 2023-02-20 DIAGNOSIS — E03.9 ACQUIRED HYPOTHYROIDISM: ICD-10-CM

## 2023-02-20 LAB
ERYTHROCYTE [DISTWIDTH] IN BLOOD BY AUTOMATED COUNT: 13.1 % (ref 11.9–14.6)
HCT VFR BLD AUTO: 41.1 % (ref 35.8–46.3)
HGB BLD-MCNC: 14 G/DL (ref 11.7–15.4)
MCH RBC QN AUTO: 31.5 PG (ref 26.1–32.9)
MCHC RBC AUTO-ENTMCNC: 34.1 G/DL (ref 31.4–35)
MCV RBC AUTO: 92.4 FL (ref 82–102)
NRBC # BLD: 0 K/UL (ref 0–0.2)
PLATELET # BLD AUTO: 270 K/UL (ref 150–450)
PMV BLD AUTO: 10.1 FL (ref 9.4–12.3)
RBC # BLD AUTO: 4.45 M/UL (ref 4.05–5.2)
WBC # BLD AUTO: 8 K/UL (ref 4.3–11.1)

## 2023-02-20 PROCEDURE — G8484 FLU IMMUNIZE NO ADMIN: HCPCS | Performed by: INTERNAL MEDICINE

## 2023-02-20 PROCEDURE — 1123F ACP DISCUSS/DSCN MKR DOCD: CPT | Performed by: INTERNAL MEDICINE

## 2023-02-20 PROCEDURE — G0439 PPPS, SUBSEQ VISIT: HCPCS | Performed by: INTERNAL MEDICINE

## 2023-02-20 RX ORDER — PANTOPRAZOLE SODIUM 40 MG/1
40 TABLET, DELAYED RELEASE ORAL DAILY
Qty: 30 TABLET | Refills: 5 | Status: SHIPPED | OUTPATIENT
Start: 2023-02-20

## 2023-02-20 SDOH — ECONOMIC STABILITY: INCOME INSECURITY: HOW HARD IS IT FOR YOU TO PAY FOR THE VERY BASICS LIKE FOOD, HOUSING, MEDICAL CARE, AND HEATING?: SOMEWHAT HARD

## 2023-02-20 SDOH — ECONOMIC STABILITY: FOOD INSECURITY: WITHIN THE PAST 12 MONTHS, YOU WORRIED THAT YOUR FOOD WOULD RUN OUT BEFORE YOU GOT MONEY TO BUY MORE.: NEVER TRUE

## 2023-02-20 SDOH — ECONOMIC STABILITY: HOUSING INSECURITY
IN THE LAST 12 MONTHS, WAS THERE A TIME WHEN YOU DID NOT HAVE A STEADY PLACE TO SLEEP OR SLEPT IN A SHELTER (INCLUDING NOW)?: NO

## 2023-02-20 SDOH — ECONOMIC STABILITY: FOOD INSECURITY: WITHIN THE PAST 12 MONTHS, THE FOOD YOU BOUGHT JUST DIDN'T LAST AND YOU DIDN'T HAVE MONEY TO GET MORE.: NEVER TRUE

## 2023-02-20 ASSESSMENT — PATIENT HEALTH QUESTIONNAIRE - PHQ9
SUM OF ALL RESPONSES TO PHQ QUESTIONS 1-9: 0
1. LITTLE INTEREST OR PLEASURE IN DOING THINGS: 0
SUM OF ALL RESPONSES TO PHQ QUESTIONS 1-9: 0

## 2023-02-20 ASSESSMENT — LIFESTYLE VARIABLES
HOW OFTEN DO YOU HAVE A DRINK CONTAINING ALCOHOL: 2-4 TIMES A MONTH
HOW MANY STANDARD DRINKS CONTAINING ALCOHOL DO YOU HAVE ON A TYPICAL DAY: 1 OR 2

## 2023-02-20 NOTE — PROGRESS NOTES
Medicare Annual Wellness Visit    Huong Pat is here for Dementia and Medicare AWV    Assessment & Plan   Frontotemporal dementia [facility])  Medicare annual wellness visit, subsequent  Severe Alzheimer's dementia of other onset without behavioral disturbance, psychotic disturbance, mood disturbance, or anxiety ([facility] [address]  Comments:  suspect frontal temporal dementia  Orders:  -     External Referral To Geriatrics  -     TSH; Future  -     Comprehensive Metabolic Panel; Future  -     CBC; Future  Cerebrovascular disease, unspecified  -     External Referral To Geriatrics  -     TSH; Future  -     Comprehensive Metabolic Panel; Future  -     CBC; Future  Acquired hypothyroidism  -     TSH; Future      Recommendations for Preventive Services Due: see orders and patient instructions/AVS.  Recommended screening schedule for the next 5-10 years is provided to the patient in written form: see Patient Instructions/AVS.     Return in 6 months (on 8/20/2023) for Medicare Annual Wellness Visit in 1 year, f/u condition. Subjective   The following acute and/or chronic problems were also addressed today:  Jeffery Sullivan was seen today for dementia and medicare awv. Diagnoses and all orders for this visit:    Frontotemporal dementia ([facility] [address]    Medicare annual wellness visit, subsequent    Severe Alzheimer's dementia of other onset without behavioral disturbance, psychotic disturbance, mood disturbance, or anxiety ([facility] [address]  Comments:  suspect frontal temporal dementia  Orders:  -     External Referral To Geriatrics  -     TSH; Future  -     Comprehensive Metabolic Panel; Future  -     CBC; Future  -     CBC  -     Comprehensive Metabolic Panel  -     TSH    Cerebrovascular disease, unspecified  -     External Referral To Geriatrics  -     TSH; Future  -     Comprehensive Metabolic Panel; Future  -     CBC; Future  -     CBC  -     Comprehensive Metabolic Panel  -     TSH    Acquired hypothyroidism  -     TSH;  Future  - TSH    Other orders  -     pantoprazole (PROTONIX) 40 MG tablet; Take 1 tablet by mouth daily       Patient's complete Health Risk Assessment and screening values have been reviewed and are found in Flowsheets. The following problems were reviewed today and where indicated follow up appointments were made and/or referrals ordered. Positive Risk Factor Screenings with Interventions:       Cognitive: Words recalled: 3 Words Recalled           Total Score Interpretation: Normal Mini-Cog      Interventions:  Patient comments: having good day,could not remember objects for me             Weight and Activity:  Physical Activity: Inactive    Days of Exercise per Week: 0 days    Minutes of Exercise per Session: 0 min     On average, how many days per week do you engage in moderate to strenuous exercise (like a brisk walk)?: 0 days  Have you lost any weight without trying in the past 3 months?: No  Body mass index: (!) 26.62    Wt Readings from Last 3 Encounters:   02/20/23 170 lb (77.1 kg)   10/06/22 167 lb (75.8 kg)   04/18/22 179 lb (81.2 kg)       Inactivity Interventions: Will try start walking more          ADL's:   Patient reports needing help with:  Select all that apply: (!) Laundry, Banking/Finances, Shopping, Telephone Use, Food Preparation, Transportation, Taking Medications  Interventions:  Patient declined any further interventions or treatment                    Objective   Vitals:    02/20/23 1433   BP: 138/80   Pulse: 68   SpO2: 97%   Weight: 170 lb (77.1 kg)   Height: 5' 7\" (1.702 m)      Body mass index is 26.63 kg/m². Allergies   Allergen Reactions    Other Swelling     Novocain     Sulfa Antibiotics Other (See Comments)     Makes her feel bad     Prior to Visit Medications    Medication Sig Taking?  Authorizing Provider   pantoprazole (PROTONIX) 40 MG tablet Take 1 tablet by mouth daily Yes Raymond Vu DO   donepezil (ARICEPT) 10 MG tablet Take 1 tablet by mouth daily Yes Raymond POOL DO Horace   levothyroxine (SYNTHROID) 50 MCG tablet Take 1 tablet by mouth every morning (before breakfast) Yes Raymond Vu DO   memantine (NAMENDA) 5 MG tablet Take 1 tablet by mouth daily Yes Jorge Hope DO   NONFORMULARY Lion's tegan mushroom Yes Historical Provider, MD   triamcinolone (KENALOG) 0.1 % ointment  Yes Historical Provider, MD   RESVERATROL PO Take 1,450 mg by mouth Yes Ar Automatic Reconciliation   ascorbic acid (VITAMIN C) 1000 MG tablet Take by mouth Yes Ar Automatic Reconciliation   Cholecalciferol 50 MCG (2000 UT) TABS Take by mouth Yes Ar Automatic Reconciliation       CareTeam (Including outside providers/suppliers regularly involved in providing care):   Patient Care Team:  Jorge Hope DO as PCP - General  Jorge Hope DO as PCP - Empaneled Provider     Reviewed and updated this visit:  Tobacco  Allergies  Meds  Problems  Med Hx  Surg Hx  Soc Hx  Fam Hx               Jorge Hope DO

## 2023-02-20 NOTE — PATIENT INSTRUCTIONS
Learning About Being Active as an Older Adult  Why is being active important as you get older? Being active is one of the best things you can do for your health. And it's never too late to start. Being active--or getting active, if you aren't already--has definite benefits. It can:  Give you more energy,  Keep your mind sharp. Improve balance to reduce your risk of falls. Help you manage chronic illness with fewer medicines. No matter how old you are, how fit you are, or what health problems you have, there is a form of activity that will work for you. And the more physical activity you can do, the better your overall health will be. What kinds of activity can help you stay healthy? Being more active will make your daily activities easier. Physical activity includes planned exercise and things you do in daily life. There are four types of activity:  Aerobic. Doing aerobic activity makes your heart and lungs strong. Includes walking, dancing, and gardening. Aim for at least 2½ hours spread throughout the week. It improves your energy and can help you sleep better. Muscle-strengthening. This type of activity can help maintain muscle and strengthen bones. Includes climbing stairs, using resistance bands, and lifting or carrying heavy loads. Aim for at least twice a week. It can help protect the knees and other joints. Stretching. Stretching gives you better range of motion in joints and muscles. Includes upper arm stretches, calf stretches, and gentle yoga. Aim for at least twice a week, preferably after your muscles are warmed up from other activities. It can help you function better in daily life. Balancing. This helps you stay coordinated and have good posture. Includes heel-to-toe walking, pamela chi, and certain types of yoga. Aim for at least 3 days a week. It can reduce your risk of falling.   Even if you have a hard time meeting the recommendations, it's better to be more active than less active. All activity done in each category counts toward your weekly total. You'd be surprised how daily things like carrying groceries, keeping up with grandchildren, and taking the stairs can add up. What keeps you from being active? If you've had a hard time being more active, you're not alone. Maybe you remember being able to do more. Or maybe you've never thought of yourself as being active. It's frustrating when you can't do the things you want. Being more active can help. What's holding you back? Getting started. Have a goal, but break it into easy tasks. Small steps build into big accomplishments. Staying motivated. If you feel like skipping your activity, remember your goal. Maybe you want to move better and stay independent. Every activity gets you one step closer. Not feeling your best.  Start with 5 minutes of an activity you enjoy. Prove to yourself you can do it. As you get comfortable, increase your time. You may not be where you want to be. But you're in the process of getting there. Everyone starts somewhere. How can you find safe ways to stay active? Talk with your doctor about any physical challenges you're facing. Make a plan with your doctor if you have a health problem or aren't sure how to get started with activity. If you're already active, ask your doctor if there is anything you should change to stay safe as your body and health change. If you tend to feel dizzy after you take medicine, avoid activity at that time. Try being active before you take your medicine. This will reduce your risk of falls. If you plan to be active at home, make sure to clear your space before you get started. Remove things like TV cords, coffee tables, and throw rugs. It's safest to have plenty of space to move freely. The key to getting more active is to take it slow and steady. Try to improve only a little bit at a time.  Pick just one area to improve on at first. And if an activity hurts, stop and talk to your doctor. Where can you learn more? Go to http://www.dodd.com/ and enter P600 to learn more about \"Learning About Being Active as an Older Adult. \"  Current as of: October 10, 2022               Content Version: 13.5  © 3641-9016 Healthwise, Incorporated. Care instructions adapted under license by [facility] ([facility]). If you have questions about a medical condition or this instruction, always ask your healthcare professional. [place] [address] any warranty or liability for your use of this information. Learning About Activities of Daily Living  What are activities of daily living? Activities of daily living (ADLs) are the basic self-care tasks you do every day. As you age, and if you have health problems, you may find that it's harder to do these things for yourself. That's when you may need some help. Your doctor uses ADLs to measure how much help you need. Knowing what you can and can't do for yourself is an important first step to getting help. And when you have the help you need, you can stay as independent as possible. Your doctor will want to know if you are able to do tasks such as: Take a bath or shower without help. Go to the bathroom by yourself. Dress and undress without help. Shave, comb your hair, and brush teeth on your own. Get in and out of bed or a chair without help. Feed yourself without help. If you are having trouble doing basic self-care tasks, talk with your doctor. You may want to bring a caregiver or family member who can help the doctor understand your needs and abilities. How will a doctor assess your ADLs? Asking about ADLs is part of a routine health checkup your doctor will likely do as you age.  Your health check might be done in a doctor's office, in your home, or at a hospital. The goal is to find out if you are having any problems that could make your health problems worse or that make it unsafe for you to be on your own. To measure your ADLs, your doctor will ask how hard it is for you to do routine tasks. He or she may also want to know if you have changed the way you do a task because of a health problem. He or she may watch how you:  Walk back and forth. Keep your balance while you stand or walk. Move from sitting to standing or from a bed to a chair. Button or unbutton a shirt or sweater. Remove and put on your shoes. It's normal to feel a little worried or anxious if you find you can't do all the things you used to be able to do. Talking with your doctor about ADLs isn't a test that you either pass or fail. It's just a way to get more information about your health and safety. Follow-up care is a key part of your treatment and safety. Be sure to make and go to all appointments, and call your doctor if you are having problems. It's also a good idea to know your test results and keep a list of the medicines you take. Current as of: October 6, 2021               Content Version: 13.5  © 7815-4349 Healthwise, [employer]. Care instructions adapted under license by [facility] ([facility]). If you have questions about a medical condition or this instruction, always ask your healthcare professional. [address] any warranty or liability for your use of this information. Advance Directives: Care Instructions  Overview  An advance directive is a legal way to state your wishes at the end of your life. It tells your family and your doctor what to do if you can't say what you want. There are two main types of advance directives. You can change them any time your wishes change. Living will. This form tells your family and your doctor your wishes about life support and other treatment. The form is also called a declaration. Medical power of . This form lets you name a person to make treatment decisions for you when you can't speak for yourself.  This person is called a health care agent (health care proxy, health care surrogate). The form is also called a durable power of  for health care. If you do not have an advance directive, decisions about your medical care may be made by a family member, or by a doctor or a  who doesn't know you. It may help to think of an advance directive as a gift to the people who care for you. If you have one, they won't have to make tough decisions by themselves. For more information, including forms for your state, see the [address] website (www.caringinfo.org/planning/advance-directives/). Follow-up care is a key part of your treatment and safety. Be sure to make and go to all appointments, and call your doctor if you are having problems. It's also a good idea to know your test results and keep a list of the medicines you take. What should you include in an advance directive? Many states have a unique advance directive form. (It may ask you to address specific issues.) Or you might use a universal form that's approved by many states. If your form doesn't tell you what to address, it may be hard to know what to include in your advance directive. Use the questions below to help you get started. Who do you want to make decisions about your medical care if you are not able to? What life-support measures do you want if you have a serious illness that gets worse over time or can't be cured? What are you most afraid of that might happen? (Maybe you're afraid of having pain, losing your independence, or being kept alive by machines.)  Where would you prefer to die? (Your home? A hospital? A nursing home?)  Do you want to donate your organs when you die? Do you want certain Scientology practices performed before you die? When should you call for help? Be sure to contact your doctor if you have any questions. Where can you learn more?   Go to http://www.MyAGENT.com/ and enter R264 to learn more about \"Advance Directives: Care Instructions. \"  Current as of: June 16, 2022               Content Version: 13.5  © 3250-9797 [employer]. Care instructions adapted under license by [facility] ([facility]). If you have questions about a medical condition or this instruction, always ask your healthcare professional. [address] any warranty or liability for your use of this information. A Healthy Heart: Care Instructions  Your Care Instructions     Coronary artery disease, also called heart disease, occurs when a substance called plaque builds up in the vessels that supply oxygen-rich blood to your heart muscle. This can narrow the blood vessels and reduce blood flow. A heart attack happens when blood flow is completely blocked. A high-fat diet, smoking, and other factors increase the risk of heart disease. Your doctor has found that you have a chance of having heart disease. You can do lots of things to keep your heart healthy. It may not be easy, but you can change your diet, exercise more, and quit smoking. These steps really work to lower your chance of heart disease. Follow-up care is a key part of your treatment and safety. Be sure to make and go to all appointments, and call your doctor if you are having problems. It's also a good idea to know your test results and keep a list of the medicines you take. How can you care for yourself at home? Diet    Use less salt when you cook and eat. This helps lower your blood pressure. Taste food before salting. Add only a little salt when you think you need it. With time, your taste buds will adjust to less salt.     Eat fewer snack items, fast foods, canned soups, and other high-salt, high-fat, processed foods.     Read food labels and try to avoid saturated and trans fats. They increase your risk of heart disease by raising cholesterol levels.     Limit the amount of solid fat-butter, margarine, and shortening-you eat.  Use olive, peanut, or canola oil when you cook. Bake, broil, and steam foods instead of frying them.     Eat a variety of fruit and vegetables every day. Dark green, deep orange, red, or yellow fruits and vegetables are especially good for you. Examples include spinach, carrots, peaches, and berries.     Foods high in fiber can reduce your cholesterol and provide important vitamins and minerals. High-fiber foods include whole-grain cereals and breads, oatmeal, beans, brown rice, citrus fruits, and apples.     Eat lean proteins. Heart-healthy proteins include seafood, lean meats and poultry, eggs, beans, peas, nuts, seeds, and soy products.     Limit drinks and foods with added sugar. These include candy, desserts, and soda pop. Lifestyle changes    If your doctor recommends it, get more exercise. Walking is a good choice. Bit by bit, increase the amount you walk every day. Try for at least 30 minutes on most days of the week. You also may want to swim, bike, or do other activities.     Do not smoke. If you need help quitting, talk to your doctor about stop-smoking programs and medicines. These can increase your chances of quitting for good. Quitting smoking may be the most important step you can take to protect your heart. It is never too late to quit.     Limit alcohol to 2 drinks a day for men and 1 drink a day for women. Too much alcohol can cause health problems.     Manage other health problems such as diabetes, high blood pressure, and high cholesterol. If you think you may have a problem with alcohol or drug use, talk to your doctor. Medicines    Take your medicines exactly as prescribed. Call your doctor if you think you are having a problem with your medicine.     If your doctor recommends aspirin, take the amount directed each day. Make sure you take aspirin and not another kind of pain reliever, such as acetaminophen (Tylenol). When should you call for help? Call 911 if you have symptoms of a heart attack.  These may include:    Chest pain or pressure, or a strange feeling in the chest.     Sweating.     Shortness of breath.     Pain, pressure, or a strange feeling in the back, neck, jaw, or upper belly or in one or both shoulders or arms.     Lightheadedness or sudden weakness.     A fast or irregular heartbeat. After you call 911, the  may tell you to chew 1 adult-strength or 2 to 4 low-dose aspirin. Wait for an ambulance. Do not try to drive yourself. Watch closely for changes in your health, and be sure to contact your doctor if you have any problems. Where can you learn more? Go to http://www.dodd.com/ and enter F075 to learn more about \"A Healthy Heart: Care Instructions. \"  Current as of: September 7, 2022               Content Version: 13.5  © 9434-0353 Healthwise, Incorporated. Care instructions adapted under license by [facility] ([facility]). If you have questions about a medical condition or this instruction, always ask your healthcare professional. [place] [address] any warranty or liability for your use of this information. Personalized Preventive Plan for Nan Purdy - 2/20/2023  Medicare offers a range of preventive health benefits. Some of the tests and screenings are paid in full while other may be subject to a deductible, co-insurance, and/or copay. Some of these benefits include a comprehensive review of your medical history including lifestyle, illnesses that may run in your family, and various assessments and screenings as appropriate. After reviewing your medical record and screening and assessments performed today your provider may have ordered immunizations, labs, imaging, and/or referrals for you. A list of these orders (if applicable) as well as your Preventive Care list are included within your After Visit Summary for your review.     Other Preventive Recommendations:    A preventive eye exam performed by an eye specialist is recommended every 1-2 years to screen for glaucoma; cataracts, macular degeneration, and other eye disorders. A preventive dental visit is recommended every 6 months. Try to get at least 150 minutes of exercise per week or 10,000 steps per day on a pedometer . Order or download the FREE \"Exercise & Physical Activity: Your Everyday Guide\" from [employer]. Call 8-119.502.5163 or search [employer] online. You need 4562-8459 mg of calcium and 7922-8639 IU of vitamin D per day. It is possible to meet your calcium requirement with diet alone, but a vitamin D supplement is usually necessary to meet this goal.  When exposed to the sun, use a sunscreen that protects against both UVA and UVB radiation with an SPF of 30 or greater. Reapply every 2 to 3 hours or after sweating, drying off with a towel, or swimming. Always wear a seat belt when traveling in a car. Always wear a helmet when riding a bicycle or motorcycle.

## 2023-02-21 LAB
ALBUMIN SERPL-MCNC: 3 G/DL (ref 3.2–4.6)
ALBUMIN/GLOB SERPL: 0.6 (ref 0.4–1.6)
ALP SERPL-CCNC: 66 U/L (ref 50–136)
ALT SERPL-CCNC: 24 U/L (ref 12–65)
ANION GAP SERPL CALC-SCNC: 4 MMOL/L (ref 2–11)
AST SERPL-CCNC: 15 U/L (ref 15–37)
BILIRUB SERPL-MCNC: 0.5 MG/DL (ref 0.2–1.1)
BUN SERPL-MCNC: 13 MG/DL (ref 8–23)
CALCIUM SERPL-MCNC: 9.4 MG/DL (ref 8.3–10.4)
CHLORIDE SERPL-SCNC: 109 MMOL/L (ref 101–110)
CO2 SERPL-SCNC: 26 MMOL/L (ref 21–32)
CREAT SERPL-MCNC: 0.8 MG/DL (ref 0.6–1)
GLOBULIN SER CALC-MCNC: 4.8 G/DL (ref 2.8–4.5)
GLUCOSE SERPL-MCNC: 91 MG/DL (ref 65–100)
POTASSIUM SERPL-SCNC: 4.1 MMOL/L (ref 3.5–5.1)
PROT SERPL-MCNC: 7.8 G/DL (ref 6.3–8.2)
SODIUM SERPL-SCNC: 139 MMOL/L (ref 133–143)
TSH, 3RD GENERATION: 1.51 UIU/ML (ref 0.36–3.74)

## 2023-06-26 ENCOUNTER — TELEPHONE (OUTPATIENT)
Dept: INTERNAL MEDICINE CLINIC | Facility: CLINIC | Age: 78
End: 2023-06-26

## 2023-06-26 RX ORDER — LEVOTHYROXINE SODIUM 0.05 MG/1
50 TABLET ORAL
Qty: 90 TABLET | Refills: 3 | Status: SHIPPED | OUTPATIENT
Start: 2023-06-26

## 2023-07-17 ENCOUNTER — OFFICE VISIT (OUTPATIENT)
Dept: INTERNAL MEDICINE CLINIC | Facility: CLINIC | Age: 78
End: 2023-07-17
Payer: MEDICARE

## 2023-07-17 VITALS
BODY MASS INDEX: 26.06 KG/M2 | HEART RATE: 87 BPM | WEIGHT: 166 LBS | DIASTOLIC BLOOD PRESSURE: 90 MMHG | SYSTOLIC BLOOD PRESSURE: 132 MMHG | TEMPERATURE: 98.5 F | HEIGHT: 67 IN | OXYGEN SATURATION: 98 %

## 2023-07-17 DIAGNOSIS — K80.20 CALCULUS OF GALLBLADDER WITHOUT CHOLECYSTITIS WITHOUT OBSTRUCTION: Primary | ICD-10-CM

## 2023-07-17 DIAGNOSIS — R10.9 ABDOMINAL BLOATING WITH CRAMPS: ICD-10-CM

## 2023-07-17 DIAGNOSIS — R14.0 ABDOMINAL BLOATING WITH CRAMPS: ICD-10-CM

## 2023-07-17 DIAGNOSIS — G30.8 SEVERE ALZHEIMER'S DEMENTIA OF OTHER ONSET WITHOUT BEHAVIORAL DISTURBANCE, PSYCHOTIC DISTURBANCE, MOOD DISTURBANCE, OR ANXIETY (HCC): ICD-10-CM

## 2023-07-17 DIAGNOSIS — F02.C0 SEVERE ALZHEIMER'S DEMENTIA OF OTHER ONSET WITHOUT BEHAVIORAL DISTURBANCE, PSYCHOTIC DISTURBANCE, MOOD DISTURBANCE, OR ANXIETY (HCC): ICD-10-CM

## 2023-07-17 PROCEDURE — G8400 PT W/DXA NO RESULTS DOC: HCPCS | Performed by: NURSE PRACTITIONER

## 2023-07-17 PROCEDURE — 1123F ACP DISCUSS/DSCN MKR DOCD: CPT | Performed by: NURSE PRACTITIONER

## 2023-07-17 PROCEDURE — 1036F TOBACCO NON-USER: CPT | Performed by: NURSE PRACTITIONER

## 2023-07-17 PROCEDURE — 99213 OFFICE O/P EST LOW 20 MIN: CPT | Performed by: NURSE PRACTITIONER

## 2023-07-17 PROCEDURE — G8417 CALC BMI ABV UP PARAM F/U: HCPCS | Performed by: NURSE PRACTITIONER

## 2023-07-17 PROCEDURE — G8427 DOCREV CUR MEDS BY ELIG CLIN: HCPCS | Performed by: NURSE PRACTITIONER

## 2023-07-17 PROCEDURE — 1090F PRES/ABSN URINE INCON ASSESS: CPT | Performed by: NURSE PRACTITIONER

## 2023-07-17 NOTE — PROGRESS NOTES
General: There is no distension. Palpations: Abdomen is soft. There is no mass. Tenderness: There is no abdominal tenderness. There is no guarding. Hernia: No hernia is present. Musculoskeletal:      Cervical back: Neck supple. Neurological:      Mental Status: She is alert. Psychiatric:         Mood and Affect: Mood normal.                An electronic signature was used to authenticate this note.     --Sherial Pallas, APRIAN - CNP

## 2023-07-28 ENCOUNTER — TELEPHONE (OUTPATIENT)
Dept: INTERNAL MEDICINE CLINIC | Facility: CLINIC | Age: 78
End: 2023-07-28

## 2023-07-28 DIAGNOSIS — K80.20 CALCULUS OF GALLBLADDER WITHOUT CHOLECYSTITIS WITHOUT OBSTRUCTION: Primary | ICD-10-CM

## 2023-07-28 NOTE — TELEPHONE ENCOUNTER
----- Message from Kasey Duarte sent at 7/28/2023 11:50 AM EDT -----  Subject: Referral Request    Reason for referral request? PT , Estephania David is reaching out wanting to   let PT PCPJason that they are wanting to move forward with a   referral for a surgeon to have the PTs hugh bladder taken out. Please   reach out to the PT or Raymond to let them know when the referral has been   made so that they are aware of the next step in the process for them. Provider patient wants to be referred to(if known):     Provider Phone Number(if known):     Additional Information for Provider?   ---------------------------------------------------------------------------  --------------  Anastasiia [place] Diane    [phone]; OK to leave message on voicemail  ---------------------------------------------------------------------------  --------------

## 2023-08-21 ENCOUNTER — OFFICE VISIT (OUTPATIENT)
Dept: SURGERY | Age: 78
End: 2023-08-21
Payer: MEDICARE

## 2023-08-21 ENCOUNTER — PREP FOR PROCEDURE (OUTPATIENT)
Dept: SURGERY | Age: 78
End: 2023-08-21

## 2023-08-21 VITALS
WEIGHT: 165.2 LBS | HEART RATE: 114 BPM | SYSTOLIC BLOOD PRESSURE: 139 MMHG | BODY MASS INDEX: 25.87 KG/M2 | DIASTOLIC BLOOD PRESSURE: 60 MMHG

## 2023-08-21 DIAGNOSIS — K80.20 GALLSTONES: ICD-10-CM

## 2023-08-21 DIAGNOSIS — K80.20 GALLSTONES: Primary | ICD-10-CM

## 2023-08-21 PROCEDURE — 99204 OFFICE O/P NEW MOD 45 MIN: CPT | Performed by: SURGERY

## 2023-08-21 PROCEDURE — 1090F PRES/ABSN URINE INCON ASSESS: CPT | Performed by: SURGERY

## 2023-08-21 PROCEDURE — 1123F ACP DISCUSS/DSCN MKR DOCD: CPT | Performed by: SURGERY

## 2023-08-21 PROCEDURE — G8400 PT W/DXA NO RESULTS DOC: HCPCS | Performed by: SURGERY

## 2023-08-21 PROCEDURE — G8427 DOCREV CUR MEDS BY ELIG CLIN: HCPCS | Performed by: SURGERY

## 2023-08-21 PROCEDURE — 1036F TOBACCO NON-USER: CPT | Performed by: SURGERY

## 2023-08-21 PROCEDURE — G8417 CALC BMI ABV UP PARAM F/U: HCPCS | Performed by: SURGERY

## 2023-08-21 ASSESSMENT — ENCOUNTER SYMPTOMS
DIARRHEA: 0
ABDOMINAL PAIN: 1
NAUSEA: 0
EYES NEGATIVE: 1
RESPIRATORY NEGATIVE: 1
ALLERGIC/IMMUNOLOGIC NEGATIVE: 1
CONSTIPATION: 0

## 2023-08-21 NOTE — PROGRESS NOTES
8/21/2023    Estella Rogers  MRN: [id number]      CHIEF COMPLAINT: Gallstone      PRIMARY CARE PHYSICIAN: Nisha Schaefer DO      HISTORY:  She complains of intermittent upper abdominal pain for the last several months to years. Previous ultrasound that showed a large gallstone in the gallbladder without findings of cholecystitis. Her symptoms seem to have worsened although history is limited due to her Alzheimer's dementia. She denies nausea. She is eating without difficulty. No changes in her bowel or bladder habits. With the worsening of her symptoms she underwent a repeat ultrasound showed a large nonmobile gallstone in the gallbladder lumen without cholecystitis. She was referred for consideration for cholecystectomy. REVIEW OF SYSTEMS:  Review of Systems   Constitutional: Negative. HENT: Negative. Eyes: Negative. Respiratory: Negative. Cardiovascular: Negative. Gastrointestinal:  Positive for abdominal pain. Negative for constipation, diarrhea and nausea. Endocrine: Negative. Genitourinary: Negative. Musculoskeletal: Negative. Skin: Negative. Allergic/Immunologic: Negative. Neurological: Negative. Hematological: Negative. Psychiatric/Behavioral: Negative. History reviewed. No pertinent past medical history.     Current Outpatient Medications   Medication Sig Dispense Refill    levothyroxine (SYNTHROID) 50 MCG tablet Take 1 tablet by mouth every morning (before breakfast) 90 tablet 3    pantoprazole (PROTONIX) 40 MG tablet Take 1 tablet by mouth daily 30 tablet 5    donepezil (ARICEPT) 10 MG tablet Take 1 tablet by mouth daily 90 tablet 3    memantine (NAMENDA) 5 MG tablet Take 1 tablet by mouth daily 30 tablet 11    NONFORMULARY Lion's tegan mushroom      triamcinolone (KENALOG) 0.1 % ointment       RESVERATROL PO Take 1,450 mg by mouth      ascorbic acid (VITAMIN C) 1000 MG tablet Take by mouth      Cholecalciferol 50 MCG (2000 UT)

## 2023-08-28 ASSESSMENT — PATIENT HEALTH QUESTIONNAIRE - PHQ9
SUM OF ALL RESPONSES TO PHQ9 QUESTIONS 1 & 2: 0
SUM OF ALL RESPONSES TO PHQ QUESTIONS 1-9: 0
SUM OF ALL RESPONSES TO PHQ9 QUESTIONS 1 & 2: 0
2. FEELING DOWN, DEPRESSED OR HOPELESS: NOT AT ALL
SUM OF ALL RESPONSES TO PHQ QUESTIONS 1-9: 0
1. LITTLE INTEREST OR PLEASURE IN DOING THINGS: NOT AT ALL
SUM OF ALL RESPONSES TO PHQ QUESTIONS 1-9: 0
2. FEELING DOWN, DEPRESSED OR HOPELESS: 0
SUM OF ALL RESPONSES TO PHQ QUESTIONS 1-9: 0
1. LITTLE INTEREST OR PLEASURE IN DOING THINGS: 0

## 2023-08-29 ENCOUNTER — OFFICE VISIT (OUTPATIENT)
Dept: INTERNAL MEDICINE CLINIC | Facility: CLINIC | Age: 78
End: 2023-08-29
Payer: MEDICARE

## 2023-08-29 VITALS
BODY MASS INDEX: 25.58 KG/M2 | OXYGEN SATURATION: 99 % | DIASTOLIC BLOOD PRESSURE: 78 MMHG | HEART RATE: 71 BPM | HEIGHT: 67 IN | TEMPERATURE: 98 F | WEIGHT: 163 LBS | SYSTOLIC BLOOD PRESSURE: 120 MMHG

## 2023-08-29 DIAGNOSIS — F02.C0 SEVERE ALZHEIMER'S DEMENTIA OF OTHER ONSET WITHOUT BEHAVIORAL DISTURBANCE, PSYCHOTIC DISTURBANCE, MOOD DISTURBANCE, OR ANXIETY (HCC): Primary | ICD-10-CM

## 2023-08-29 DIAGNOSIS — K80.20 CALCULUS OF GALLBLADDER WITHOUT CHOLECYSTITIS WITHOUT OBSTRUCTION: ICD-10-CM

## 2023-08-29 DIAGNOSIS — G30.8 SEVERE ALZHEIMER'S DEMENTIA OF OTHER ONSET WITHOUT BEHAVIORAL DISTURBANCE, PSYCHOTIC DISTURBANCE, MOOD DISTURBANCE, OR ANXIETY (HCC): Primary | ICD-10-CM

## 2023-08-29 DIAGNOSIS — E03.9 ACQUIRED HYPOTHYROIDISM: ICD-10-CM

## 2023-08-29 LAB
ALBUMIN SERPL-MCNC: 2.3 G/DL (ref 3.2–4.6)
ALBUMIN/GLOB SERPL: 0.3 (ref 0.4–1.6)
ALP SERPL-CCNC: 87 U/L (ref 50–136)
ALT SERPL-CCNC: 20 U/L (ref 12–65)
ANION GAP SERPL CALC-SCNC: 5 MMOL/L (ref 2–11)
AST SERPL-CCNC: 14 U/L (ref 15–37)
BILIRUB SERPL-MCNC: 0.3 MG/DL (ref 0.2–1.1)
BUN SERPL-MCNC: 8 MG/DL (ref 8–23)
CALCIUM SERPL-MCNC: 9.6 MG/DL (ref 8.3–10.4)
CHLORIDE SERPL-SCNC: 109 MMOL/L (ref 101–110)
CHOLEST SERPL-MCNC: 172 MG/DL
CO2 SERPL-SCNC: 28 MMOL/L (ref 21–32)
CREAT SERPL-MCNC: 1 MG/DL (ref 0.6–1)
ERYTHROCYTE [DISTWIDTH] IN BLOOD BY AUTOMATED COUNT: 13.6 % (ref 11.9–14.6)
GLOBULIN SER CALC-MCNC: 7.5 G/DL (ref 2.8–4.5)
GLUCOSE SERPL-MCNC: 100 MG/DL (ref 65–100)
HCT VFR BLD AUTO: 35.9 % (ref 35.8–46.3)
HDLC SERPL-MCNC: 46 MG/DL (ref 40–60)
HDLC SERPL: 3.7
HGB BLD-MCNC: 11.6 G/DL (ref 11.7–15.4)
LDLC SERPL CALC-MCNC: 92.6 MG/DL
MCH RBC QN AUTO: 32.4 PG (ref 26.1–32.9)
MCHC RBC AUTO-ENTMCNC: 32.3 G/DL (ref 31.4–35)
MCV RBC AUTO: 100.3 FL (ref 82–102)
NRBC # BLD: 0 K/UL (ref 0–0.2)
PLATELET # BLD AUTO: 231 K/UL (ref 150–450)
PMV BLD AUTO: 10.2 FL (ref 9.4–12.3)
POTASSIUM SERPL-SCNC: 3.8 MMOL/L (ref 3.5–5.1)
PROT SERPL-MCNC: 9.8 G/DL (ref 6.3–8.2)
RBC # BLD AUTO: 3.58 M/UL (ref 4.05–5.2)
SODIUM SERPL-SCNC: 142 MMOL/L (ref 133–143)
TRIGL SERPL-MCNC: 167 MG/DL (ref 35–150)
TSH, 3RD GENERATION: 1.15 UIU/ML (ref 0.36–3.74)
VLDLC SERPL CALC-MCNC: 33.4 MG/DL (ref 6–23)
WBC # BLD AUTO: 5.9 K/UL (ref 4.3–11.1)

## 2023-08-29 PROCEDURE — 1090F PRES/ABSN URINE INCON ASSESS: CPT | Performed by: INTERNAL MEDICINE

## 2023-08-29 PROCEDURE — 1036F TOBACCO NON-USER: CPT | Performed by: INTERNAL MEDICINE

## 2023-08-29 PROCEDURE — 99214 OFFICE O/P EST MOD 30 MIN: CPT | Performed by: INTERNAL MEDICINE

## 2023-08-29 PROCEDURE — G8417 CALC BMI ABV UP PARAM F/U: HCPCS | Performed by: INTERNAL MEDICINE

## 2023-08-29 PROCEDURE — G8427 DOCREV CUR MEDS BY ELIG CLIN: HCPCS | Performed by: INTERNAL MEDICINE

## 2023-08-29 PROCEDURE — G8400 PT W/DXA NO RESULTS DOC: HCPCS | Performed by: INTERNAL MEDICINE

## 2023-08-29 PROCEDURE — 1123F ACP DISCUSS/DSCN MKR DOCD: CPT | Performed by: INTERNAL MEDICINE

## 2023-08-29 RX ORDER — PANTOPRAZOLE SODIUM 40 MG/1
40 TABLET, DELAYED RELEASE ORAL DAILY
Qty: 30 TABLET | Refills: 5 | Status: CANCELLED | OUTPATIENT
Start: 2023-08-29

## 2023-08-29 RX ORDER — MEMANTINE HYDROCHLORIDE 5 MG/1
5 TABLET ORAL DAILY
Qty: 30 TABLET | Refills: 11 | Status: SHIPPED | OUTPATIENT
Start: 2023-08-29

## 2023-08-29 NOTE — PROGRESS NOTES
02/20/2023 13  8 - 23 MG/DL Final    Creatinine 02/20/2023 0.80  0.6 - 1.0 MG/DL Final    Est, Glom Filt Rate 02/20/2023 >60  >60 ml/min/1.73m2 Final    Comment:   Pediatric calculator link: Ilana.at. org/professionals/kdoqi/gfr_calculatorped    These results are not intended for use in patients <25years of age. eGFR results are calculated without a race factor using  the 2021 CKD-EPI equation. Careful clinical correlation is recommended, particularly when comparing to results calculated using previous equations. The CKD-EPI equation is less accurate in patients with extremes of muscle mass, extra-renal metabolism of creatinine, excessive creatine ingestion, or following therapy that affects renal tubular secretion. Calcium 02/20/2023 9.4  8.3 - 10.4 MG/DL Final    Total Bilirubin 02/20/2023 0.5  0.2 - 1.1 MG/DL Final    ALT 02/20/2023 24  12 - 65 U/L Final    AST 02/20/2023 15  15 - 37 U/L Final    Alk Phosphatase 02/20/2023 66  50 - 136 U/L Final    Total Protein 02/20/2023 7.8  6.3 - 8.2 g/dL Final    Albumin 02/20/2023 3.0 (L)  3.2 - 4.6 g/dL Final    Globulin 02/20/2023 4.8 (H)  2.8 - 4.5 g/dL Final    Albumin/Globulin Ratio 02/20/2023 0.6  0.4 - 1.6   Final    TSH, 3RD GENERATION 02/20/2023 1.510  0.358 - 3.740 uIU/mL Final        History reviewed. No pertinent past medical history. Family History   Problem Relation Age of Onset    Cancer Mother         stomach-liver    Breast Cancer Neg Hx         Social History     Socioeconomic History    Marital status:      Spouse name: Not on file    Number of children: Not on file    Years of education: Not on file    Highest education level: Not on file   Occupational History    Not on file   Tobacco Use    Smoking status: Never    Smokeless tobacco: Never   Substance and Sexual Activity    Alcohol use:  Yes     Alcohol/week: 0.0 standard drinks    Drug use: No    Sexual activity: Not on file   Other Topics Concern    Not on file   Social

## 2023-09-11 PROBLEM — K80.20 GALLSTONES: Status: ACTIVE | Noted: 2023-01-01

## 2023-09-21 RX ORDER — LEVOTHYROXINE SODIUM 0.05 MG/1
50 TABLET ORAL
Qty: 90 TABLET | Refills: 3 | Status: SHIPPED | OUTPATIENT
Start: 2023-09-21

## 2023-09-27 ENCOUNTER — TELEPHONE (OUTPATIENT)
Dept: INTERNAL MEDICINE CLINIC | Facility: CLINIC | Age: 78
End: 2023-09-27

## 2023-09-27 DIAGNOSIS — G30.8 SEVERE ALZHEIMER'S DEMENTIA OF OTHER ONSET WITHOUT BEHAVIORAL DISTURBANCE, PSYCHOTIC DISTURBANCE, MOOD DISTURBANCE, OR ANXIETY (HCC): Primary | ICD-10-CM

## 2023-09-27 DIAGNOSIS — F02.C0 SEVERE ALZHEIMER'S DEMENTIA OF OTHER ONSET WITHOUT BEHAVIORAL DISTURBANCE, PSYCHOTIC DISTURBANCE, MOOD DISTURBANCE, OR ANXIETY (HCC): Primary | ICD-10-CM

## 2023-09-27 NOTE — TELEPHONE ENCOUNTER
Patients  called and states that the patient is needing a referral for in home care for the patient. The patients  called and would like to know if this can be sent to [address] Phone # [phone] Fax # [phone]. Please Advise.

## 2023-09-29 ENCOUNTER — HOME CARE VISIT (OUTPATIENT)
Dept: HOSPICE | Facility: HOSPICE | Age: 78
End: 2023-09-29

## 2023-09-29 NOTE — TELEPHONE ENCOUNTER
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

## 2023-10-03 ENCOUNTER — OFFICE VISIT (OUTPATIENT)
Dept: INTERNAL MEDICINE CLINIC | Facility: CLINIC | Age: 78
End: 2023-10-03
Payer: MEDICARE

## 2023-10-03 VITALS
HEIGHT: 67 IN | HEART RATE: 62 BPM | WEIGHT: 152 LBS | BODY MASS INDEX: 23.86 KG/M2 | SYSTOLIC BLOOD PRESSURE: 130 MMHG | DIASTOLIC BLOOD PRESSURE: 80 MMHG | RESPIRATION RATE: 17 BRPM | OXYGEN SATURATION: 99 %

## 2023-10-03 DIAGNOSIS — F02.80 FRONTOTEMPORAL DEMENTIA (HCC): ICD-10-CM

## 2023-10-03 DIAGNOSIS — G31.09 FRONTOTEMPORAL DEMENTIA (HCC): ICD-10-CM

## 2023-10-03 DIAGNOSIS — K80.20 CALCULUS OF GALLBLADDER WITHOUT CHOLECYSTITIS WITHOUT OBSTRUCTION: Primary | ICD-10-CM

## 2023-10-03 DIAGNOSIS — G89.29 CHRONIC MIDLINE THORACIC BACK PAIN: ICD-10-CM

## 2023-10-03 DIAGNOSIS — M54.6 CHRONIC MIDLINE THORACIC BACK PAIN: ICD-10-CM

## 2023-10-03 PROCEDURE — G8484 FLU IMMUNIZE NO ADMIN: HCPCS | Performed by: INTERNAL MEDICINE

## 2023-10-03 PROCEDURE — G8420 CALC BMI NORM PARAMETERS: HCPCS | Performed by: INTERNAL MEDICINE

## 2023-10-03 PROCEDURE — 99214 OFFICE O/P EST MOD 30 MIN: CPT | Performed by: INTERNAL MEDICINE

## 2023-10-03 PROCEDURE — 1090F PRES/ABSN URINE INCON ASSESS: CPT | Performed by: INTERNAL MEDICINE

## 2023-10-03 PROCEDURE — G8400 PT W/DXA NO RESULTS DOC: HCPCS | Performed by: INTERNAL MEDICINE

## 2023-10-03 PROCEDURE — G8427 DOCREV CUR MEDS BY ELIG CLIN: HCPCS | Performed by: INTERNAL MEDICINE

## 2023-10-03 PROCEDURE — 1036F TOBACCO NON-USER: CPT | Performed by: INTERNAL MEDICINE

## 2023-10-03 PROCEDURE — 1123F ACP DISCUSS/DSCN MKR DOCD: CPT | Performed by: INTERNAL MEDICINE

## 2023-10-03 RX ORDER — TRAMADOL HYDROCHLORIDE 50 MG/1
TABLET ORAL
COMMUNITY
Start: 2023-09-10 | End: 2023-10-03 | Stop reason: SDUPTHER

## 2023-10-03 RX ORDER — KETOROLAC TROMETHAMINE 10 MG/1
10 TABLET, FILM COATED ORAL EVERY 6 HOURS PRN
Qty: 20 TABLET | Refills: 0 | Status: SHIPPED | OUTPATIENT
Start: 2023-10-03 | End: 2024-10-02

## 2023-10-03 RX ORDER — ONDANSETRON 4 MG/1
4 TABLET, ORALLY DISINTEGRATING ORAL EVERY 6 HOURS PRN
COMMUNITY
Start: 2023-09-10

## 2023-10-03 RX ORDER — TRAMADOL HYDROCHLORIDE 50 MG/1
50 TABLET ORAL EVERY 6 HOURS PRN
Qty: 40 TABLET | Refills: 0 | Status: SHIPPED | OUTPATIENT
Start: 2023-10-03 | End: 2023-10-13

## 2023-10-03 NOTE — PROGRESS NOTES
Amount: 200 mg  -     ketorolac (TORADOL) 10 MG tablet; Take 1 tablet by mouth every 6 hours as needed for Pain  -     XR THORACIC SPINE (3 VIEWS);  Future  -     External Referral to Physical Therapy                 Jorge Salinas DO

## 2023-10-06 ASSESSMENT — ENCOUNTER SYMPTOMS
VOMITING: 0
NAUSEA: 0
ABDOMINAL PAIN: 0
BACK PAIN: 1

## 2023-10-16 DIAGNOSIS — G31.09 FRONTOTEMPORAL DEMENTIA (HCC): Primary | ICD-10-CM

## 2023-10-16 DIAGNOSIS — F02.80 FRONTOTEMPORAL DEMENTIA (HCC): Primary | ICD-10-CM

## 2023-10-17 ENCOUNTER — HOME CARE VISIT (OUTPATIENT)
Dept: SCHEDULING | Facility: HOME HEALTH | Age: 78
End: 2023-10-17
Payer: MEDICARE

## 2023-10-17 VITALS
RESPIRATION RATE: 18 BRPM | DIASTOLIC BLOOD PRESSURE: 60 MMHG | BODY MASS INDEX: 22.29 KG/M2 | SYSTOLIC BLOOD PRESSURE: 110 MMHG | WEIGHT: 142 LBS | HEART RATE: 72 BPM | TEMPERATURE: 97.8 F | HEIGHT: 67 IN

## 2023-10-17 PROCEDURE — G0299 HHS/HOSPICE OF RN EA 15 MIN: HCPCS

## 2023-10-17 ASSESSMENT — ENCOUNTER SYMPTOMS
STOOL DESCRIPTION: SOFT
PAIN LOCATION - PAIN QUALITY: ACHY
HEMOPTYSIS: 0

## 2023-10-17 NOTE — HOSPICE
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 PPS 40%. Pt lives in her home with her spouse, Rene Todd. Pt is Alert but disoriented. Pt is newly (past 3 weeks) chairbound & having incontinent spells. Pt is sleeping 18 hrs daily. Pts appetite is none. Pt has lost from 170# to 142# since feb 2023. New onset pain in back. Pt currently uses tramadol. Md added Tylenol & a comfort brea today. Pt needs assistance with all of her ADLS. HHA services were accepted. [facility] to deliver: table, wheelchair, hospital bed, & BSC  Already in home: A rollator. Consents were signed at the home today by Spouse, Rene Todd, due to patient's dementia. Pt is a DNR. Meds Profiled w/ [facility]:  comfort brea, tramadol, Tylenol & senna  Meds ordered for local fill: none  Supplies ordered from medline: medium pull ups & medium briefs, chux, wipes, barrier cream  RN educated [employer] spouse, Rene Todd, on hospice process and philosophy, medication management, pain control, skin care and protection, fall precautions, anxiety strategies, home safety, and social distancing due to COVID-19. Pt history, assessment, meds and status reviewed with patient's hospice attending MD, Dr. Ingris Carroll. Epi Canales & spouse, Rene Todd, have been made aware of [facility], referral to be initiated as needed. Patient is appropriate for hospice services at this time. HHA services were accepted at this time. SW and SC services accepted. Caregiver made aware that an RN will be completing a follow-up visit within 24 hours. Handwritten medication list, [facility] book and magnet with [facility] phone number left in home. RN educated Caregiver to call [facility] 24/7 phone line with any changes, concerns, or falls with verbalized understanding.

## 2023-10-18 ENCOUNTER — HOME CARE VISIT (OUTPATIENT)
Dept: SCHEDULING | Facility: HOME HEALTH | Age: 78
End: 2023-10-18
Payer: MEDICARE

## 2023-10-18 PROCEDURE — G0155 HHCP-SVS OF CSW,EA 15 MIN: HCPCS

## 2023-10-18 PROCEDURE — G0299 HHS/HOSPICE OF RN EA 15 MIN: HCPCS

## 2023-10-19 ENCOUNTER — HOME CARE VISIT (OUTPATIENT)
Dept: SCHEDULING | Facility: HOME HEALTH | Age: 78
End: 2023-10-19
Payer: MEDICARE

## 2023-10-19 VITALS
SYSTOLIC BLOOD PRESSURE: 109 MMHG | TEMPERATURE: 97.9 F | HEART RATE: 76 BPM | RESPIRATION RATE: 16 BRPM | DIASTOLIC BLOOD PRESSURE: 71 MMHG

## 2023-10-19 PROCEDURE — G0156 HHCP-SVS OF AIDE,EA 15 MIN: HCPCS

## 2023-10-19 NOTE — HOSPICE
Mrs. Dann Lopez is a 65 y/o  female who was admitted to [address] [place] [address] services on 10/17/23 with a primary diagnosis of end stage Alzheimer's without behavioral disturbances. Pt is a DNR code status. Initial SWA was completed in the home on 10/18/23 - SW was greeted at the home by Pt.'s , Luz Haley and invited into visit. Pt was sitting on the couch and attempted to return SW's greeting but only managed to mumble something non understandable (word salad). Pt attempted to stand at one point but her gait is unsteady and her  guided her back to the couch. Pt.'s  openly discussed his wife's history of Alzheimer's (symptoms started approx. 6 yrs. ago and she was actually diagnosed 2 yrs. ago) and her rapid decline since early September - Pt is now requiring assistance to max care with all ADL's (was bathing dressing, feeding herself and her appetite was good prior to September). Pt.'s appetite is currently poor (mostly bites) and she has lost approx. 20 lbs. in the last month. Pt.'s  reports having a good support system of family, friends and spiritual support from their [employer]ond[employer] family. Social history: Pt and her  have been  for 48 yrs. and have 6 children - Romi Haile and Familia live locally. Chrissy Soto lives in [place] and Travis Cohen lives in [place]. 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 They are members of [employer] in [place]. Vinny Villalobos arrangements are undecided at this time. Hospice services, contact information and role of SW reviewed with Pt's  and all questions answered. Plan of care developed and agreed upon with Pt.'s  as well. SW provided emotional support for Pt.'s  via active listening along with education, validation of his feelings/concerns and reassurance.  SW will plan to visit 1-2 x month and PRN to provide ongoing emotional support along with continued assessment of psychosocial and bereavement concerns and

## 2023-10-19 NOTE — HOSPICE
Routine home visit conducted today. Present for visit were pt, pt's /PCG - Raymond, patient's son, and RN. RN greeted by . Ricky Albert and Raymond's son is in town from [facility]. Ricky Albert sitting on the couch in the living room area in her [facility]. Phylicia smiles and shakes RN hand. She is unable to tell RN her name and date of birth, but agrees with her  when he states information. Patient looks at her  during visit to answer questions. She is able to answer some yes or no questions and is able to show me where her back pain is. Raymond states Ricky Albert only ate a few bites of apple sauce and eggs during the day yesterday, but ate some ice cream at night. She is unable to hold a cup and remember to drink out of the straw at times. Ricky Albert allows [employer] to assist her with meals, but still only eats a few bites. Patient has incontinent briefs on, but still goes to the [facility]. Raymond stands Ricky Albert up and holds her hands/arms and guides her to the restroom. Patient was using rollator until recently is unable to understand how to use it anymore. Zweemie does state that Ricky Albert will try to get up by herself if she is alone and so he closely watches her and does not leave her alone. Hospital bed is present, but Ricky Albert is currently still sleeping int he room with Zweemie, so he knows when she gets up and wants to keep her there for as long as her can. Full assessment performed, see ROS. Education provided, see POC. No medications refilled via [facility]. No supply/DME needs expressed. [employer] and Ricky Albert reminded of 24/7 RN availability and encouraged to call [facility] at any time with questions or concerns.

## 2023-10-20 ENCOUNTER — HOME CARE VISIT (OUTPATIENT)
Dept: SCHEDULING | Facility: HOME HEALTH | Age: 78
End: 2023-10-20
Payer: MEDICARE

## 2023-10-21 NOTE — HOSPICE
S: Mrs. Calvin De La Cruz was sitting on her dining room at the time of the visit. The patient's , daughter and son, were present at the time of the visit. B: initial visit/assessment   A: Mrs. Calvin De La Cruz 70-year-old female presents the ED with complaints of atraumatic back pain. Onset a few days ago. The patient's daughter welcomed the  at her door. The patient was sitting on her dining room chair. The patient appeared to be comfortable and with no pain. The patient's , Lavern Love, shared about their life together for 62 years and have 6 children and 15 grandchildren. They are from a [place] background; they are member at [employer] [facility] [employer]; the patient's  is one of the elders at their Mosque. Mr. Lavern Love said that they are well supported by their family and [employer] friends. The patient has several brothers and sisters and several of them are local. The  shared with the patient Psalm 62:5-8, offered a mediation and prayed with the patient.  provided spiritual care and emotional support through pastoral presence, meaningful conversation, active listening, supportive responses, Bible reading (Psalm 58) and prayer.    R: assurance of care/availability; active listening; Psalm 23; prayer   Visit: 1 x / mo.; 2 prn

## 2023-10-22 ENCOUNTER — HOME CARE VISIT (OUTPATIENT)
Dept: HOSPICE | Facility: HOSPICE | Age: 78
End: 2023-10-22
Payer: MEDICARE

## 2023-10-24 NOTE — HOSPICE
Routine home visit conducted today. Present for visit were pt, pt's /PCG - Douglas, and RN. Juan Jose Huizar stated that the complaining of burning while urinating was a miscommunication and he believes Ricky Bailey was trying to say that her back was hurting. Douglas rotation tylenol and tramadol as ordered for pain. Juan Jose Huizar states there has been more of a decline in patients walking - before she would take steps and now she is doing the shuffle and he has had to use the walker to get her to the bathroom that is close by that she previously would walk to. Patient continues to have no appetite. Douglas reports Ricky Bailey ate a cookie yesterday and has eaten half an apple sauce cup today. She is also forgetting how to use the straw so her fluid intake has decr. Instructed Douglas to act as of he is sucking on a straw to show her how to do it and see if this helps her. Ricky Bailey looks exhausted during the visit, douglas reports patient was up most of the night. She had been agitated because of the overwhelming visitors they had had the day before. Comfort kit opened and instructed on ativan for anxiety and agitation. Full assessment performed, see ROS. Education provided, see POC. No supply/DME needs expressed. Douglas reminded of 24/7 RN availability and encouraged to call [facility] at any time with questions or concerns.

## 2023-10-25 PROBLEM — Z51.5 HOSPICE CARE: Status: ACTIVE | Noted: 2023-01-01

## 2023-10-25 NOTE — HOSPICE
PRN visit conducted with Dr. Karen Davis. Upon arrival, RN greeted at door by raymond/pt's /PCG. He reports Letty Hughes fell this morning. He left her in the bed and usually Letty Hughes would wake up and wait on Raymond to get her up. Lavern Love seems to think Letty Hughes sat up and slipped off the bed. He found her sitting on the floor at the side of the bed. He got her comfortable on the floor while he waited on his son to help him get her up. Patient has no new bruises or skin tears. Patient is weaker today than yesterday and  can no longer get her up. Patient lying in hospital bed restless upon arrival. When rolling to change patient, she is stiff and it is very hard to move her. She grimaces and cannot assist with rolling. Patient's brief with slightly wet and had a small BM. Dr. Karen Davis arrives and educates family and RN to start administering morphine. Family on board at this time and requesting on comfort measures at this time. Raymond calls family before RN leaves requesting assistance. He states he has plenty of help between the kids and his sister in law who is a nurse. Raymond reminded to call [facility] with questions and concerns.

## 2023-10-26 NOTE — HOSPICE
SW made a PRN visit this afternoon to assess for concerns/needs and provide support as indicated (Pt has made a significant decline since last week per [facility] primary nurse, Tera Alford). SW was greeted at the home by Pt.'s  Penelope Cisneros and invited into visit. Pt.'s sister Quentin Cruz, son Kayden Oro and two friends of the family were present as well. Pt was lying in her hospital bed with eyes closed and appeared to be resting comfortably throughout the visit. Pt.'s  and family discussed her continued decline including the changes over the last two days, appear to understand the disease progression and are appropriately grieving their anticipated loss. SW provided emotional support for Pt.'s  and family via active listening along with validation of their feelings/concerns, EOL education and reassurance. Pt.'s  and family expressed appreciation for the visit and continued support. SW will be increasing visits to weekly for additional support. No other changes in the plan of care. SW will continue to provide emotional support and assessment of psychosocial and bereavement concerns and needs. Visit discussed with Reece Obrien.

## 2023-10-26 NOTE — HOSPICE
PRN visit conducted today to assess for imminency. Patient breathing rapidly and only grimacing to painful stimuli at this time. Morphine incr and medication education given to family. Carrillo inserted for comfort and at family request. BP not taken for family request for comfort measures only at this point. Satya Baker is comfortable at the end of visit and surronded by her loved ones. Patient added to imminent list for team to be aware. Imminent education provided to family. Family instructed to call Herminia Avila concerns and at time of death.

## 2023-10-27 NOTE — HOSPICE
SW made a PRN joint visit with [facility] primary nurse, Karin Garcia this afternoon to assess for concerns/needs and provide support as indicated (Pt is now imminent per Lucie Peña). Nurse and SW were greeted at the home by Pt.'s  Nidia Callahan and invited into visit. Three of Pt.'s sons and Pt.'s sister Cih Monday were all present at the bedside as well. One of Pt.'s daughters arrived during the visit. Pt was lying in her hospital bed with eyes closed (non-responsive) and appeared to be resting comfortably throughout the visit. Nurse and SW provided emotional support for Pt.'s  and family via active listening along with validation of their feelings and reassurance. Pt.'s  and family expressed appreciation for the visit and continued support. SW will continue weekly visits for additional support. No changes in the plan of care. SW will continue to provide emotional support and assessment of psychosocial and bereavement concerns and needs.

## 2023-10-27 NOTE — HOSPICE
Daily visit for imminent patient conducted today. Lots of family present for visit. Patient looks comfortable. Patient having small apnea periods about 10 seconds. Patient's color remains the same. Patient and family well supported. Reminded to call [facility] with questions and at TOD.

## 2023-10-28 NOTE — HOSPICE
Patients family called stating that patient was not breathing. Upon SN arrival patient was absent of vital signs and breathing. SN verified that patient had  at 0255 am. Dr. Margo Zheng was notified of patients death. Family present and coping well. SN called [facility] for body retrieval at family request. SN called [facility] to have equipment picked up. SN bathed patient and removed sargent catheter. Family refused the need of a  or  today. All meds were disposed of by SN using a disopozo bag and patients family witnessed. Email sent to [facility] staff informing them of patients death. [facility]  faxed notification of pts death. Instructed family to call [facility] with any questions.